# Patient Record
Sex: MALE | Race: WHITE | NOT HISPANIC OR LATINO
[De-identification: names, ages, dates, MRNs, and addresses within clinical notes are randomized per-mention and may not be internally consistent; named-entity substitution may affect disease eponyms.]

---

## 2017-01-17 ENCOUNTER — APPOINTMENT (OUTPATIENT)
Dept: GASTROENTEROLOGY | Facility: HOSPITAL | Age: 47
End: 2017-01-17

## 2017-01-17 ENCOUNTER — OUTPATIENT (OUTPATIENT)
Dept: OUTPATIENT SERVICES | Facility: HOSPITAL | Age: 47
LOS: 1 days | End: 2017-01-17
Payer: COMMERCIAL

## 2017-01-17 DIAGNOSIS — K21.9 GASTRO-ESOPHAGEAL REFLUX DISEASE WITHOUT ESOPHAGITIS: ICD-10-CM

## 2017-01-17 DIAGNOSIS — Z12.11 ENCOUNTER FOR SCREENING FOR MALIGNANT NEOPLASM OF COLON: ICD-10-CM

## 2017-01-17 PROCEDURE — 43239 EGD BIOPSY SINGLE/MULTIPLE: CPT | Mod: 59

## 2017-01-17 PROCEDURE — 45378 DIAGNOSTIC COLONOSCOPY: CPT

## 2017-01-17 PROCEDURE — G0105: CPT

## 2017-01-17 PROCEDURE — 43239 EGD BIOPSY SINGLE/MULTIPLE: CPT

## 2017-01-18 LAB — SURGICAL PATHOLOGY STUDY: SIGNIFICANT CHANGE UP

## 2018-01-12 ENCOUNTER — TRANSCRIPTION ENCOUNTER (OUTPATIENT)
Age: 48
End: 2018-01-12

## 2018-01-17 ENCOUNTER — APPOINTMENT (OUTPATIENT)
Dept: PULMONOLOGY | Facility: CLINIC | Age: 48
End: 2018-01-17

## 2018-05-08 ENCOUNTER — TRANSCRIPTION ENCOUNTER (OUTPATIENT)
Age: 48
End: 2018-05-08

## 2018-08-29 ENCOUNTER — TRANSCRIPTION ENCOUNTER (OUTPATIENT)
Age: 48
End: 2018-08-29

## 2019-02-11 ENCOUNTER — TRANSCRIPTION ENCOUNTER (OUTPATIENT)
Age: 49
End: 2019-02-11

## 2019-03-07 ENCOUNTER — TRANSCRIPTION ENCOUNTER (OUTPATIENT)
Age: 49
End: 2019-03-07

## 2019-03-12 ENCOUNTER — TRANSCRIPTION ENCOUNTER (OUTPATIENT)
Age: 49
End: 2019-03-12

## 2019-03-29 ENCOUNTER — OUTPATIENT (OUTPATIENT)
Dept: OUTPATIENT SERVICES | Facility: HOSPITAL | Age: 49
LOS: 1 days | End: 2019-03-29
Payer: COMMERCIAL

## 2019-03-29 VITALS
TEMPERATURE: 98 F | WEIGHT: 199.96 LBS | HEIGHT: 74 IN | DIASTOLIC BLOOD PRESSURE: 70 MMHG | OXYGEN SATURATION: 98 % | HEART RATE: 64 BPM | RESPIRATION RATE: 16 BRPM | SYSTOLIC BLOOD PRESSURE: 114 MMHG

## 2019-03-29 DIAGNOSIS — I49.9 CARDIAC ARRHYTHMIA, UNSPECIFIED: ICD-10-CM

## 2019-03-29 DIAGNOSIS — Z87.19 PERSONAL HISTORY OF OTHER DISEASES OF THE DIGESTIVE SYSTEM: Chronic | ICD-10-CM

## 2019-03-29 PROCEDURE — 93005 ELECTROCARDIOGRAM TRACING: CPT

## 2019-03-29 PROCEDURE — C1764: CPT

## 2019-03-29 PROCEDURE — ZZZZZ: CPT

## 2019-03-29 PROCEDURE — 93010 ELECTROCARDIOGRAM REPORT: CPT

## 2019-03-29 PROCEDURE — 33285 INSJ SUBQ CAR RHYTHM MNTR: CPT

## 2019-03-29 RX ORDER — SODIUM CHLORIDE 9 MG/ML
3 INJECTION INTRAMUSCULAR; INTRAVENOUS; SUBCUTANEOUS EVERY 8 HOURS
Qty: 0 | Refills: 0 | Status: DISCONTINUED | OUTPATIENT
Start: 2019-03-29 | End: 2019-04-13

## 2019-03-29 NOTE — H&P CARDIOLOGY - HISTORY OF PRESENT ILLNESS
48 yo  male pt with no significant prior medical history presents for Loop Recorder insertion. 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 MRI showed ?CVA lesion. Pt denies having weakness, numbness or any stroke symptoms but recalls having mild chest discomfort and dizziness on exertion, had Echo and stress test done in Dr. Baig's office then referred for Loop recorder.

## 2019-04-10 ENCOUNTER — APPOINTMENT (OUTPATIENT)
Dept: GASTROENTEROLOGY | Facility: CLINIC | Age: 49
End: 2019-04-10

## 2019-04-16 ENCOUNTER — APPOINTMENT (OUTPATIENT)
Dept: ELECTROPHYSIOLOGY | Facility: CLINIC | Age: 49
End: 2019-04-16
Payer: COMMERCIAL

## 2019-04-16 ENCOUNTER — NON-APPOINTMENT (OUTPATIENT)
Age: 49
End: 2019-04-16

## 2019-04-16 VITALS
WEIGHT: 202 LBS | HEIGHT: 74 IN | DIASTOLIC BLOOD PRESSURE: 80 MMHG | SYSTOLIC BLOOD PRESSURE: 121 MMHG | HEART RATE: 66 BPM | BODY MASS INDEX: 25.93 KG/M2 | OXYGEN SATURATION: 98 %

## 2019-04-16 PROBLEM — K44.9 DIAPHRAGMATIC HERNIA WITHOUT OBSTRUCTION OR GANGRENE: Chronic | Status: ACTIVE | Noted: 2019-03-29

## 2019-04-16 PROBLEM — I63.9 CEREBRAL INFARCTION, UNSPECIFIED: Chronic | Status: ACTIVE | Noted: 2019-03-29

## 2019-04-16 PROCEDURE — 99212 OFFICE O/P EST SF 10 MIN: CPT

## 2019-04-16 PROCEDURE — 93291 INTERROG DEV EVAL SCRMS IP: CPT

## 2019-11-01 ENCOUNTER — APPOINTMENT (OUTPATIENT)
Dept: ELECTROPHYSIOLOGY | Facility: CLINIC | Age: 49
End: 2019-11-01
Payer: COMMERCIAL

## 2019-11-01 VITALS — DIASTOLIC BLOOD PRESSURE: 78 MMHG | SYSTOLIC BLOOD PRESSURE: 119 MMHG | OXYGEN SATURATION: 96 % | HEART RATE: 61 BPM

## 2019-11-01 PROCEDURE — 93291 INTERROG DEV EVAL SCRMS IP: CPT

## 2019-11-01 RX ORDER — ROSUVASTATIN CALCIUM 5 MG/1
5 TABLET, FILM COATED ORAL
Refills: 0 | Status: ACTIVE | COMMUNITY

## 2020-01-16 ENCOUNTER — TRANSCRIPTION ENCOUNTER (OUTPATIENT)
Age: 50
End: 2020-01-16

## 2020-02-20 ENCOUNTER — TRANSCRIPTION ENCOUNTER (OUTPATIENT)
Age: 50
End: 2020-02-20

## 2020-03-12 ENCOUNTER — APPOINTMENT (OUTPATIENT)
Dept: PULMONOLOGY | Facility: CLINIC | Age: 50
End: 2020-03-12
Payer: COMMERCIAL

## 2020-03-12 VITALS
WEIGHT: 214 LBS | TEMPERATURE: 98.5 F | HEIGHT: 71 IN | BODY MASS INDEX: 29.96 KG/M2 | RESPIRATION RATE: 16 BRPM | SYSTOLIC BLOOD PRESSURE: 120 MMHG | HEART RATE: 86 BPM | DIASTOLIC BLOOD PRESSURE: 70 MMHG | OXYGEN SATURATION: 97 %

## 2020-03-12 DIAGNOSIS — Z82.49 FAMILY HISTORY OF ISCHEMIC HEART DISEASE AND OTHER DISEASES OF THE CIRCULATORY SYSTEM: ICD-10-CM

## 2020-03-12 DIAGNOSIS — Z83.79 FAMILY HISTORY OF OTHER DISEASES OF THE DIGESTIVE SYSTEM: ICD-10-CM

## 2020-03-12 DIAGNOSIS — Z83.2 FAMILY HISTORY OF DISEASES OF THE BLOOD AND BLOOD-FORMING ORGANS AND CERTAIN DISORDERS INVOLVING THE IMMUNE MECHANISM: ICD-10-CM

## 2020-03-12 PROCEDURE — 95012 NITRIC OXIDE EXP GAS DETER: CPT

## 2020-03-12 PROCEDURE — 94729 DIFFUSING CAPACITY: CPT

## 2020-03-12 PROCEDURE — 71046 X-RAY EXAM CHEST 2 VIEWS: CPT

## 2020-03-12 PROCEDURE — 94060 EVALUATION OF WHEEZING: CPT

## 2020-03-12 PROCEDURE — 99204 OFFICE O/P NEW MOD 45 MIN: CPT | Mod: 25

## 2020-03-12 PROCEDURE — 94618 PULMONARY STRESS TESTING: CPT

## 2020-03-12 PROCEDURE — 94726 PLETHYSMOGRAPHY LUNG VOLUMES: CPT

## 2020-03-12 PROCEDURE — ZZZZZ: CPT

## 2020-03-12 NOTE — HISTORY OF PRESENT ILLNESS
[TextBox_4] : Mr. MCKEON is a 50 year old male hx of herniated disk, CVA, loop recorder for ?Afib, colonic polyp, elevated cholesterol, prior GERD, s/p hiatal hernia surgery, childhood asthma,  presenting to the office today for initial pulmonary evaluation. His chief complaint is\par - He [facility]. He had nasal congestion and slight wheeze. He was given prednisone and Zithromax. \par - He has a viral infection in his eyes, he is being treated for dry eye. \par - As a kid he had very bad asthma and was on prednisone / cough medicine. \par - he notes his sinuses are dry / congested for the last 6 mo nths\par - he notes having terrible allergies, seasonal. \par - he notes not feeling SOB when going flight of stairs\par - He notes he snores\par - nick size: 16-17 \par - could not fall asleep while watching a boring TV show\par - could not fall asleep in a car\par - he notes he memory /concentration is good \par - He notes he does not sleep well. He wakes up frequently. He goes to bed at 12am and wakes up 5am. \par - He denies moving a lot when sleeping. \par - he notes he has put on a few pounds - 10lbs. \par - he has been going to the gym more frequently\par - His bowels are regular \par - he denies swollen glands\par - he notes having always hoarse voice due to GERD \par -he denies any headaches, nausea, vomiting, fever, chills, sweats, chest pain, chest pressure, diarrhea, constipation, dysphagia, dizziness, leg swelling, leg pain.

## 2020-03-12 NOTE — PROCEDURE
[FreeTextEntry1] : Full PFT mid to moderate obstructive  flows, with a FEV1 of 3.30L, which is 73% of predicted 55% change with BD , mid to low lung volumes , and a diffusion of 3.29 , which is 116% of predicted, with a normal flow volume loop\par \par \par 6 minute walk test reveals a low saturation of 95% with no evidence of dyspnea or fatigue; walked 0 668.3 meters\par \par \par FENO was 77; a normal value being less than 25\par Fractional exhaled nitric oxide (FENO) is regarded as a simple, noninvasive method for assessing eosinophilic airway inflammation. Produced by a variety of cells within the lung, nitric oxide (NO) concentrations are generally low in healthy individuals. However, high concentrations of NO appear to be involved in nonspecific host defense mechanisms and chronic inflammatory diseases such as asthma. The American Thoracic Society (ATS) therefore has recommended using FENO to aid in the diagnosis and monitoring of eosinophilic airway inflammation and asthma, and for identifying steroid responsive individuals whose chronic respiratory symptoms may be caused by airway inflammation.\par \par \par CXR reveals a normal sized heart- loop recorder in place; no evidence of infiltrate or effusion--a normal appearing chest radiograph\par

## 2020-03-12 NOTE — PHYSICAL EXAM
[No Acute Distress] : no acute distress [Normal Oropharynx] : normal oropharynx [Normal Appearance] : normal appearance [No Neck Mass] : no neck mass [Normal Rate/Rhythm] : normal rate/rhythm [Normal S1, S2] : normal s1, s2 [No Murmurs] : no murmurs [No Resp Distress] : no resp distress [Clear to Auscultation Bilaterally] : clear to auscultation bilaterally [No Abnormalities] : no abnormalities [Benign] : benign [Normal Gait] : normal gait [No Clubbing] : no clubbing [No Cyanosis] : no cyanosis [No Edema] : no edema [FROM] : FROM [Normal Color/ Pigmentation] : normal color/ pigmentation [No Focal Deficits] : no focal deficits [Oriented x3] : oriented x3 [Normal Affect] : normal affect [II] : Mallampati Class: II [TextBox_11] : little bit of wax in ears  [TextBox_68] : I:E ratio 1:3; clear

## 2020-03-12 NOTE — ASSESSMENT
[FreeTextEntry1] : Mr. MCKOEN is a 50 year old male with a history of  herniated disk, CVA, loop recorder for ?Afib, colonic polyp, elevated cholesterol, prior GERD, s/p hiatal hernia surgery, childhood asthma who comes into the office today for pulmonary evaluation for SOB \par \par The patient's shortness of breath is multifactorial due to:\par -pulmonary disease \par      - Asthma \par      - ?HEATHER\par -poor breathing mechanics \par -little bit overweight/out of shape\par -?cardiac disease - Dr. Baig ?Afib \par - GERD / LPR\par \par Problem 1: Asthma (mild)\par -Add Bevespi 2 inhalations BID\par -Add Alvesco (160) 2 inhalations BID\par -Add Ventolin rescue inhaler 2 inhalations before exercise, Q6H (PRN)\par -Asthma is believed to be caused by inherited (genetic) and environmental factor, but its exact cause is unknown. Asthma may be triggered by allergens, lung infections, or irritants in the air. Asthma triggers are different for each person\par \par \par Problem 2: Allergy / Sinus \par -Add Olopatadine 0.6% at 1 sniff/nostril BID\par -Complete blood work to include: IgE level, eosinophil level, vitamin D level, food IgE level, and asthma profile\par Environmental measures for allergies were encouraged including mattress and pillow cover, air purifier, and environmental controls.\par \par \par Problem 3: GERD / LPR -  s/p Nissen fundoplication \par -Add Pepcid 40 mg QHS\par -Rule of 2s: avoid eating too much, eating too fast, eating too late, eating too spicy, eating too lousy, eating two hours before bed.\par -Things to avoid including overeating, spicy foods, tight clothing, eating within three hours of bed, this list is not all inclusive. \par -For treatment of reflux, possible options discussed including diet control, H2 blockers, PPIs, as well as coating motility agents discussed as treatment options. Timing of meals and proximity of last meal to sleep were discussed. If symptoms persist, a formal gastrointestinal evaluation is needed.\par \par \par Problem 4: ?HEATHER(risk: neck size, chronic reflux, poor sleep)\par - Get Home Sleep Study to rule out sleep apnea\par - Suggested Oral Appliance for sleep apnea\par Sleep apnea is associated with adverse clinical consequences which an affect most organ systems. Cardiovascular disease risk includes arrhythmias, atrial fibrillation, hypertension, coronary artery disease, and stroke. Metabolic disorders include diabetes type 2, non-alcoholic fatty liver disease. Mood disorder especially depression; and cognitive decline especially in the elderly. Associations with chronic reflux/Byrne’s esophagus some but not all inclusive. \par -Reasons include arousal consistent with hypopnea; respiratory events most prominent in REM sleep or supine position; therefore sleep staging and body position are important for accurate diagnosis and estimation of AHI.\par \par Problem 5: \par \par Problem : Poor Mechanics of Breathing\par - Proper breathing techniques were reviewed with an emphasis of exhalation. Patient instructed to breath in for 1 second and out for four seconds. Patient was encouraged to not talk while walking. \par \par Problem : a little Overweight\par -Weight loss, exercise, and diet control were discussed and are highly encouraged. Treatment options were given such as, aqua therapy, and contacting a nutritionist. Recommended to use the elliptical, stationary bike, less use of treadmill. Mindful eating was explained to the patient Obesity is associated with worsening asthma, shortness of breath, and potential for cardiac disease, diabetes, and other underlying medical conditions. \par \par Problem : Cardiac\par -recommended to follow up with a cardiologist\par \par Problem : health maintenance\par - Recommended Throat Coat Tea ([place]) \par -s/p influenza vaccine\par -recommended strep pneumonia vaccines after age 65: Prevnar-13 vaccine, followed by Pneumo vaccine 23 one year following\par -recommended early intervention for URIs\par -recommended regular osteoporosis evaluations\par -recommended early dermatological evaluations\par -recommended after the age of 50 to the age of 70, colonoscopy every 5 years \par \par  Follow up in 6-8 weeks\par -he  is recommended to call with any changes, questions, or concerns.

## 2020-03-12 NOTE — ADDENDUM
[FreeTextEntry1] : Documented by Cait Jensen acting as a scribe for Dr. Demario Tai on 03/12/2020.\par \par All medical record entries made by the Scribe were at my, Dr. Demario Tai's, direction and personally dictated by me on 03/12/2020. I have reviewed the chart and agree that the record accurately reflects my personal performance of the history, physical exam, assessment and plan. I have also personally directed, reviewed, and agree with the discharge instructions.\par

## 2020-03-14 ENCOUNTER — LABORATORY RESULT (OUTPATIENT)
Age: 50
End: 2020-03-14

## 2020-03-15 LAB
24R-OH-CALCIDIOL SERPL-MCNC: 66.5 PG/ML
25(OH)D3 SERPL-MCNC: 27.8 NG/ML
BASOPHILS # BLD AUTO: 0.05 K/UL
BASOPHILS NFR BLD AUTO: 1 %
EOSINOPHIL # BLD AUTO: 0.48 K/UL
EOSINOPHIL NFR BLD AUTO: 10 %
HCT VFR BLD CALC: 49.4 %
HGB BLD-MCNC: 16.3 G/DL
IMM GRANULOCYTES NFR BLD AUTO: 0.2 %
LYMPHOCYTES # BLD AUTO: 1.71 K/UL
LYMPHOCYTES NFR BLD AUTO: 35.8 %
MAN DIFF?: NORMAL
MCHC RBC-ENTMCNC: 31 PG
MCHC RBC-ENTMCNC: 33 GM/DL
MCV RBC AUTO: 94.1 FL
MONOCYTES # BLD AUTO: 0.64 K/UL
MONOCYTES NFR BLD AUTO: 13.4 %
NEUTROPHILS # BLD AUTO: 1.89 K/UL
NEUTROPHILS NFR BLD AUTO: 39.6 %
PLATELET # BLD AUTO: 205 K/UL
RBC # BLD: 5.25 M/UL
RBC # FLD: 13.5 %
WBC # FLD AUTO: 4.78 K/UL

## 2020-03-16 LAB
A ALTERNATA IGE QN: 0.33 KUA/L
A FUMIGATUS IGE QN: 0.11 KUA/L
C ALBICANS IGE QN: <0.1 KUA/L
C HERBARUM IGE QN: <0.1 KUA/L
CAT DANDER IGE QN: 0.22 KUA/L
CLAM IGE QN: <0.1 KUA/L
CODFISH IGE QN: <0.1 KUA/L
COMMON RAGWEED IGE QN: <0.1 KUA/L
CORN IGE QN: <0.1 KUA/L
COW MILK IGE QN: <0.1 KUA/L
D FARINAE IGE QN: 0.14 KUA/L
D PTERONYSS IGE QN: 0.12 KUA/L
DEPRECATED A ALTERNATA IGE RAST QL: NORMAL
DEPRECATED A FUMIGATUS IGE RAST QL: NORMAL
DEPRECATED C ALBICANS IGE RAST QL: 0
DEPRECATED C HERBARUM IGE RAST QL: 0
DEPRECATED CAT DANDER IGE RAST QL: NORMAL
DEPRECATED CLAM IGE RAST QL: 0
DEPRECATED CODFISH IGE RAST QL: 0
DEPRECATED COMMON RAGWEED IGE RAST QL: 0
DEPRECATED CORN IGE RAST QL: 0
DEPRECATED COW MILK IGE RAST QL: 0
DEPRECATED D FARINAE IGE RAST QL: NORMAL
DEPRECATED D PTERONYSS IGE RAST QL: NORMAL
DEPRECATED DOG DANDER IGE RAST QL: 3
DEPRECATED EGG WHITE IGE RAST QL: 0
DEPRECATED M RACEMOSUS IGE RAST QL: 0
DEPRECATED PEANUT IGE RAST QL: 0
DEPRECATED ROACH IGE RAST QL: NORMAL
DEPRECATED SCALLOP IGE RAST QL: <0.1 KUA/L
DEPRECATED SESAME SEED IGE RAST QL: 0
DEPRECATED SHRIMP IGE RAST QL: 0
DEPRECATED SOYBEAN IGE RAST QL: 0
DEPRECATED TIMOTHY IGE RAST QL: 0
DEPRECATED WALNUT IGE RAST QL: 0
DEPRECATED WHEAT IGE RAST QL: 0
DEPRECATED WHITE OAK IGE RAST QL: NORMAL
DOG DANDER IGE QN: 4.65 KUA/L
EGG WHITE IGE QN: <0.1 KUA/L
M RACEMOSUS IGE QN: <0.1 KUA/L
PEANUT IGE QN: <0.1 KUA/L
ROACH IGE QN: 0.23 KUA/L
SCALLOP IGE QN: 0
SCALLOP IGE QN: <0.1 KUA/L
SESAME SEED IGE QN: <0.1 KUA/L
SOYBEAN IGE QN: <0.1 KUA/L
TIMOTHY IGE QN: <0.1 KUA/L
TOTAL IGE SMQN RAST: 30 KU/L
WALNUT IGE QN: <0.1 KUA/L
WHEAT IGE QN: <0.1 KUA/L
WHITE OAK IGE QN: 0.26 KUA/L

## 2020-03-17 DIAGNOSIS — J01.90 ACUTE SINUSITIS, UNSPECIFIED: ICD-10-CM

## 2020-04-24 ENCOUNTER — APPOINTMENT (OUTPATIENT)
Dept: PULMONOLOGY | Facility: CLINIC | Age: 50
End: 2020-04-24

## 2020-06-05 DIAGNOSIS — Z11.59 ENCOUNTER FOR SCREENING FOR OTHER VIRAL DISEASES: ICD-10-CM

## 2020-06-10 LAB
BASOPHILS # BLD AUTO: 0.06 K/UL
BASOPHILS NFR BLD AUTO: 1 %
EOSINOPHIL # BLD AUTO: 0.48 K/UL
EOSINOPHIL NFR BLD AUTO: 8.2 %
HCT VFR BLD CALC: 47.6 %
HGB BLD-MCNC: 16.1 G/DL
IMM GRANULOCYTES NFR BLD AUTO: 0.3 %
LYMPHOCYTES # BLD AUTO: 1.88 K/UL
LYMPHOCYTES NFR BLD AUTO: 32 %
MAN DIFF?: NORMAL
MCHC RBC-ENTMCNC: 30.8 PG
MCHC RBC-ENTMCNC: 33.8 GM/DL
MCV RBC AUTO: 91 FL
MONOCYTES # BLD AUTO: 0.53 K/UL
MONOCYTES NFR BLD AUTO: 9 %
NEUTROPHILS # BLD AUTO: 2.91 K/UL
NEUTROPHILS NFR BLD AUTO: 49.5 %
PLATELET # BLD AUTO: 261 K/UL
RBC # BLD: 5.23 M/UL
RBC # FLD: 12.2 %
SARS-COV-2 IGG SERPL IA-ACNC: 9.25 AU/ML
SARS-COV-2 IGG SERPL QL IA: NEGATIVE
WBC # FLD AUTO: 5.88 K/UL

## 2020-12-08 ENCOUNTER — APPOINTMENT (OUTPATIENT)
Dept: ORTHOPEDIC SURGERY | Facility: CLINIC | Age: 50
End: 2020-12-08
Payer: COMMERCIAL

## 2020-12-08 VITALS
WEIGHT: 208 LBS | HEART RATE: 90 BPM | BODY MASS INDEX: 26.69 KG/M2 | SYSTOLIC BLOOD PRESSURE: 111 MMHG | DIASTOLIC BLOOD PRESSURE: 70 MMHG | HEIGHT: 74 IN

## 2020-12-08 VITALS — TEMPERATURE: 97.6 F

## 2020-12-08 DIAGNOSIS — M79.672 PAIN IN LEFT FOOT: ICD-10-CM

## 2020-12-08 PROCEDURE — 73630 X-RAY EXAM OF FOOT: CPT | Mod: LT

## 2020-12-08 PROCEDURE — 99203 OFFICE O/P NEW LOW 30 MIN: CPT

## 2020-12-08 PROCEDURE — 99072 ADDL SUPL MATRL&STAF TM PHE: CPT

## 2020-12-23 PROBLEM — J01.90 ACUTE RHINOSINUSITIS: Status: RESOLVED | Noted: 2020-03-17 | Resolved: 2020-12-23

## 2021-01-02 ENCOUNTER — APPOINTMENT (OUTPATIENT)
Dept: ORTHOPEDIC SURGERY | Facility: CLINIC | Age: 51
End: 2021-01-02

## 2021-01-05 ENCOUNTER — APPOINTMENT (OUTPATIENT)
Dept: ORTHOPEDIC SURGERY | Facility: CLINIC | Age: 51
End: 2021-01-05
Payer: COMMERCIAL

## 2021-01-05 DIAGNOSIS — S93.602D UNSPECIFIED SPRAIN OF LEFT FOOT, SUBSEQUENT ENCOUNTER: ICD-10-CM

## 2021-01-05 DIAGNOSIS — M62.89 OTHER SPECIFIED DISORDERS OF MUSCLE: ICD-10-CM

## 2021-01-05 PROCEDURE — 99213 OFFICE O/P EST LOW 20 MIN: CPT

## 2021-01-05 PROCEDURE — 99072 ADDL SUPL MATRL&STAF TM PHE: CPT

## 2021-04-30 ENCOUNTER — OUTPATIENT (OUTPATIENT)
Dept: OUTPATIENT SERVICES | Facility: HOSPITAL | Age: 51
LOS: 1 days | End: 2021-04-30

## 2021-04-30 VITALS
HEIGHT: 73.5 IN | SYSTOLIC BLOOD PRESSURE: 110 MMHG | TEMPERATURE: 97 F | OXYGEN SATURATION: 97 % | RESPIRATION RATE: 16 BRPM | DIASTOLIC BLOOD PRESSURE: 78 MMHG | HEART RATE: 75 BPM | WEIGHT: 214.95 LBS

## 2021-04-30 DIAGNOSIS — K43.9 VENTRAL HERNIA WITHOUT OBSTRUCTION OR GANGRENE: ICD-10-CM

## 2021-04-30 DIAGNOSIS — J45.909 UNSPECIFIED ASTHMA, UNCOMPLICATED: ICD-10-CM

## 2021-04-30 DIAGNOSIS — I63.9 CEREBRAL INFARCTION, UNSPECIFIED: ICD-10-CM

## 2021-04-30 DIAGNOSIS — Z20.822 CONTACT WITH AND (SUSPECTED) EXPOSURE TO COVID-19: ICD-10-CM

## 2021-04-30 DIAGNOSIS — Z87.19 PERSONAL HISTORY OF OTHER DISEASES OF THE DIGESTIVE SYSTEM: Chronic | ICD-10-CM

## 2021-04-30 LAB
ALBUMIN SERPL ELPH-MCNC: 4.6 G/DL — SIGNIFICANT CHANGE UP (ref 3.3–5)
ALP SERPL-CCNC: 73 U/L — SIGNIFICANT CHANGE UP (ref 40–120)
ALT FLD-CCNC: 52 U/L — HIGH (ref 4–41)
ANION GAP SERPL CALC-SCNC: 15 MMOL/L — HIGH (ref 7–14)
AST SERPL-CCNC: 20 U/L — SIGNIFICANT CHANGE UP (ref 4–40)
BILIRUB SERPL-MCNC: 1 MG/DL — SIGNIFICANT CHANGE UP (ref 0.2–1.2)
BUN SERPL-MCNC: 20 MG/DL — SIGNIFICANT CHANGE UP (ref 7–23)
CALCIUM SERPL-MCNC: 9.2 MG/DL — SIGNIFICANT CHANGE UP (ref 8.4–10.5)
CHLORIDE SERPL-SCNC: 101 MMOL/L — SIGNIFICANT CHANGE UP (ref 98–107)
CO2 SERPL-SCNC: 24 MMOL/L — SIGNIFICANT CHANGE UP (ref 22–31)
CREAT SERPL-MCNC: 1.03 MG/DL — SIGNIFICANT CHANGE UP (ref 0.5–1.3)
GLUCOSE SERPL-MCNC: 74 MG/DL — SIGNIFICANT CHANGE UP (ref 70–99)
HCT VFR BLD CALC: 47.2 % — SIGNIFICANT CHANGE UP (ref 39–50)
HGB BLD-MCNC: 16 G/DL — SIGNIFICANT CHANGE UP (ref 13–17)
MCHC RBC-ENTMCNC: 30 PG — SIGNIFICANT CHANGE UP (ref 27–34)
MCHC RBC-ENTMCNC: 33.9 GM/DL — SIGNIFICANT CHANGE UP (ref 32–36)
MCV RBC AUTO: 88.6 FL — SIGNIFICANT CHANGE UP (ref 80–100)
NRBC # BLD: 0 /100 WBCS — SIGNIFICANT CHANGE UP
NRBC # FLD: 0 K/UL — SIGNIFICANT CHANGE UP
PLATELET # BLD AUTO: 299 K/UL — SIGNIFICANT CHANGE UP (ref 150–400)
POTASSIUM SERPL-MCNC: 3.6 MMOL/L — SIGNIFICANT CHANGE UP (ref 3.5–5.3)
POTASSIUM SERPL-SCNC: 3.6 MMOL/L — SIGNIFICANT CHANGE UP (ref 3.5–5.3)
PROT SERPL-MCNC: 7.1 G/DL — SIGNIFICANT CHANGE UP (ref 6–8.3)
RBC # BLD: 5.33 M/UL — SIGNIFICANT CHANGE UP (ref 4.2–5.8)
RBC # FLD: 12.3 % — SIGNIFICANT CHANGE UP (ref 10.3–14.5)
SODIUM SERPL-SCNC: 140 MMOL/L — SIGNIFICANT CHANGE UP (ref 135–145)
WBC # BLD: 7.3 K/UL — SIGNIFICANT CHANGE UP (ref 3.8–10.5)
WBC # FLD AUTO: 7.3 K/UL — SIGNIFICANT CHANGE UP (ref 3.8–10.5)

## 2021-04-30 RX ORDER — ASPIRIN/CALCIUM CARB/MAGNESIUM 324 MG
1 TABLET ORAL
Qty: 0 | Refills: 0 | DISCHARGE

## 2021-04-30 NOTE — H&P PST ADULT - OTHER CARE PROVIDERS
Dr. Baig Cardiologist, Dr. Benjamin Ferreira Neurologist Dr. Baig Cardiologist [phone], Dr. Chandler Neurologist [phone]

## 2021-04-30 NOTE — H&P PST ADULT - SURGICAL SITE INCISION
You can access the [facility] Patient Portal, offered by [facility], by registering with the following website: [email or web address]
no

## 2021-04-30 NOTE — H&P PST ADULT - NEGATIVE ENMT SYMPTOMS
no hearing difficulty/no ear pain/no tinnitus/no vertigo/no nasal discharge/no nose bleeds/no gum bleeding/no throat pain/no dysphagia

## 2021-04-30 NOTE — H&P PST ADULT - NSICDXFAMILYHX_GEN_ALL_CORE_FT
FAMILY HISTORY:  Father  Still living? Yes, Estimated age: Age Unknown  Family history of hypertension, Age at diagnosis: Age Unknown

## 2021-04-30 NOTE — H&P PST ADULT - NSICDXPROBLEM_GEN_ALL_CORE_FT
PROBLEM DIAGNOSES  Problem: Ventral hernia without obstruction or gangrene  Assessment and Plan: Patient scheduled for ventral hernia repair on 5/20/2021  Written & verbal preop instructions, gi prophylaxis & surgical soap given  Pt verbalized good understanding.  Teach back done on surgical soap instructions.   Patient had recent evaluation with PCP, pending copy of report    Problem: CVA (cerebral vascular accident)  Assessment and Plan: Patient with history of CVA, has loop recorder OR booking notified  Patient had recent evaluation with Cardiologist, pending copy of report    Problem: Asthma  Assessment and Plan: Patient instructed to take Ventolin inhaler as needed on AM of surgery    Problem: Encounter for screening laboratory testing for COVID-19 virus  Assessment and Plan: Patient aware of need for COVID testing prior to procedure and advised to co ordinate with surgeon.        PROBLEM DIAGNOSES  Problem: Ventral hernia without obstruction or gangrene  Assessment and Plan: Patient scheduled for ventral hernia repair on 5/20/2021  Written & verbal preop instructions, gi prophylaxis & surgical soap given  Pt verbalized good understanding.  Teach back done on surgical soap instructions.   Patient had recent evaluation with PCP, pending copy of report  HEATHER precaution, OR booking notified    Problem: CVA (cerebral vascular accident)  Assessment and Plan: Patient with history of CVA, has loop recorder OR booking notified  Patient had recent evaluation with Cardiologist, pending copy of report    Problem: Asthma  Assessment and Plan: Patient instructed to take Ventolin inhaler as needed on AM of surgery    Problem: Encounter for screening laboratory testing for COVID-19 virus  Assessment and Plan: Patient aware of need for COVID testing prior to procedure and advised to co ordinate with surgeon.

## 2021-04-30 NOTE — H&P PST ADULT - HISTORY OF PRESENT ILLNESS
52 yo male presents to [facility] for preop evaluation for ventral hernia repair.  Patient reports pulling sensation in mid abdomen over the past 3 months during exercise or running. Patient diagnosed with ventral hernia without obstruction or gangrene.  Patient denies abdominal pain, nausea, vomiting, diarrhea or constipation.

## 2021-04-30 NOTE — H&P PST ADULT - NSICDXPASTMEDICALHX_GEN_ALL_CORE_FT
PAST MEDICAL HISTORY:  Asthma     CVA (cerebral vascular accident) on MRI    Hiatal hernia     Hyperlipidemia      PAST MEDICAL HISTORY:  Asthma     CVA (cerebral vascular accident) on MRI    Fatty liver     Hiatal hernia     Hyperlipidemia     Ventral hernia without obstruction or gangrene

## 2021-04-30 NOTE — H&P PST ADULT - RESPIRATORY AND THORAX COMMENTS
Patient with history of asthma Pt with history of asthma well controlled on medications no recent exacerbations

## 2021-05-01 ENCOUNTER — INPATIENT (INPATIENT)
Facility: HOSPITAL | Age: 51
LOS: 1 days | Discharge: HOME | DRG: 64 | End: 2021-05-03
Attending: NEUROLOGICAL SURGERY | Admitting: NEUROLOGICAL SURGERY
Payer: COMMERCIAL

## 2021-05-01 VITALS
TEMPERATURE: 98 F | RESPIRATION RATE: 18 BRPM | HEART RATE: 76 BPM | SYSTOLIC BLOOD PRESSURE: 132 MMHG | OXYGEN SATURATION: 99 % | HEIGHT: 73.5 IN | DIASTOLIC BLOOD PRESSURE: 96 MMHG | WEIGHT: 214.95 LBS

## 2021-05-01 DIAGNOSIS — Z87.19 PERSONAL HISTORY OF OTHER DISEASES OF THE DIGESTIVE SYSTEM: Chronic | ICD-10-CM

## 2021-05-01 DIAGNOSIS — R53.1 WEAKNESS: ICD-10-CM

## 2021-05-01 LAB
ALBUMIN SERPL ELPH-MCNC: 4.2 G/DL — SIGNIFICANT CHANGE UP (ref 3.3–5)
ALP SERPL-CCNC: 75 U/L — SIGNIFICANT CHANGE UP (ref 40–120)
ALT FLD-CCNC: 50 U/L — HIGH (ref 10–45)
ANION GAP SERPL CALC-SCNC: 13 MMOL/L — SIGNIFICANT CHANGE UP (ref 5–17)
APTT BLD: 33 SEC — SIGNIFICANT CHANGE UP (ref 27.5–35.5)
AST SERPL-CCNC: 20 U/L — SIGNIFICANT CHANGE UP (ref 10–40)
BASE EXCESS BLDV CALC-SCNC: 3.7 MMOL/L — HIGH (ref -2–2)
BASOPHILS # BLD AUTO: 0.06 K/UL — SIGNIFICANT CHANGE UP (ref 0–0.2)
BASOPHILS NFR BLD AUTO: 0.8 % — SIGNIFICANT CHANGE UP (ref 0–2)
BILIRUB SERPL-MCNC: 0.9 MG/DL — SIGNIFICANT CHANGE UP (ref 0.2–1.2)
BUN SERPL-MCNC: 18 MG/DL — SIGNIFICANT CHANGE UP (ref 7–23)
CALCIUM SERPL-MCNC: 9.1 MG/DL — SIGNIFICANT CHANGE UP (ref 8.4–10.5)
CHLORIDE SERPL-SCNC: 106 MMOL/L — SIGNIFICANT CHANGE UP (ref 96–108)
CO2 BLDV-SCNC: 32 MMOL/L — HIGH (ref 22–30)
CO2 SERPL-SCNC: 25 MMOL/L — SIGNIFICANT CHANGE UP (ref 22–31)
CREAT SERPL-MCNC: 1.12 MG/DL — SIGNIFICANT CHANGE UP (ref 0.5–1.3)
EOSINOPHIL # BLD AUTO: 0.56 K/UL — HIGH (ref 0–0.5)
EOSINOPHIL NFR BLD AUTO: 7.8 % — HIGH (ref 0–6)
GLUCOSE SERPL-MCNC: 139 MG/DL — HIGH (ref 70–99)
HCO3 BLDV-SCNC: 31 MMOL/L — HIGH (ref 21–29)
HCT VFR BLD CALC: 47.2 % — SIGNIFICANT CHANGE UP (ref 39–50)
HGB BLD-MCNC: 16.2 G/DL — SIGNIFICANT CHANGE UP (ref 13–17)
IMM GRANULOCYTES NFR BLD AUTO: 0.3 % — SIGNIFICANT CHANGE UP (ref 0–1.5)
INR BLD: 0.96 RATIO — SIGNIFICANT CHANGE UP (ref 0.88–1.16)
LYMPHOCYTES # BLD AUTO: 2.35 K/UL — SIGNIFICANT CHANGE UP (ref 1–3.3)
LYMPHOCYTES # BLD AUTO: 32.6 % — SIGNIFICANT CHANGE UP (ref 13–44)
MCHC RBC-ENTMCNC: 30.8 PG — SIGNIFICANT CHANGE UP (ref 27–34)
MCHC RBC-ENTMCNC: 34.3 GM/DL — SIGNIFICANT CHANGE UP (ref 32–36)
MCV RBC AUTO: 89.7 FL — SIGNIFICANT CHANGE UP (ref 80–100)
MONOCYTES # BLD AUTO: 0.5 K/UL — SIGNIFICANT CHANGE UP (ref 0–0.9)
MONOCYTES NFR BLD AUTO: 6.9 % — SIGNIFICANT CHANGE UP (ref 2–14)
NEUTROPHILS # BLD AUTO: 3.72 K/UL — SIGNIFICANT CHANGE UP (ref 1.8–7.4)
NEUTROPHILS NFR BLD AUTO: 51.6 % — SIGNIFICANT CHANGE UP (ref 43–77)
NRBC # BLD: 0 /100 WBCS — SIGNIFICANT CHANGE UP (ref 0–0)
PCO2 BLDV: 56 MMHG — HIGH (ref 35–50)
PH BLDV: 7.36 — SIGNIFICANT CHANGE UP (ref 7.35–7.45)
PLATELET # BLD AUTO: 261 K/UL — SIGNIFICANT CHANGE UP (ref 150–400)
PO2 BLDV: 22 MMHG — LOW (ref 25–45)
POTASSIUM SERPL-MCNC: 4.1 MMOL/L — SIGNIFICANT CHANGE UP (ref 3.5–5.3)
POTASSIUM SERPL-SCNC: 4.1 MMOL/L — SIGNIFICANT CHANGE UP (ref 3.5–5.3)
PROT SERPL-MCNC: 6.9 G/DL — SIGNIFICANT CHANGE UP (ref 6–8.3)
PROTHROM AB SERPL-ACNC: 11.5 SEC — SIGNIFICANT CHANGE UP (ref 10.6–13.6)
RBC # BLD: 5.26 M/UL — SIGNIFICANT CHANGE UP (ref 4.2–5.8)
RBC # FLD: 12.2 % — SIGNIFICANT CHANGE UP (ref 10.3–14.5)
SAO2 % BLDV: 35 % — LOW (ref 67–88)
SARS-COV-2 RNA SPEC QL NAA+PROBE: SIGNIFICANT CHANGE UP
SODIUM SERPL-SCNC: 144 MMOL/L — SIGNIFICANT CHANGE UP (ref 135–145)
TROPONIN T, HIGH SENSITIVITY RESULT: 9 NG/L — SIGNIFICANT CHANGE UP (ref 0–51)
WBC # BLD: 7.21 K/UL — SIGNIFICANT CHANGE UP (ref 3.8–10.5)
WBC # FLD AUTO: 7.21 K/UL — SIGNIFICANT CHANGE UP (ref 3.8–10.5)

## 2021-05-01 PROCEDURE — 99291 CRITICAL CARE FIRST HOUR: CPT

## 2021-05-01 PROCEDURE — 70498 CT ANGIOGRAPHY NECK: CPT | Mod: 26,MA

## 2021-05-01 PROCEDURE — 70496 CT ANGIOGRAPHY HEAD: CPT | Mod: 26,MA

## 2021-05-01 RX ORDER — ACETAMINOPHEN 500 MG
650 TABLET ORAL EVERY 6 HOURS
Refills: 0 | Status: DISCONTINUED | OUTPATIENT
Start: 2021-05-01 | End: 2021-05-03

## 2021-05-01 RX ORDER — METOCLOPRAMIDE HCL 10 MG
10 TABLET ORAL ONCE
Refills: 0 | Status: COMPLETED | OUTPATIENT
Start: 2021-05-01 | End: 2021-05-01

## 2021-05-01 RX ORDER — ALBUTEROL 90 UG/1
2 AEROSOL, METERED ORAL EVERY 6 HOURS
Refills: 0 | Status: DISCONTINUED | OUTPATIENT
Start: 2021-05-01 | End: 2021-05-03

## 2021-05-01 RX ORDER — ATORVASTATIN CALCIUM 80 MG/1
20 TABLET, FILM COATED ORAL AT BEDTIME
Refills: 0 | Status: DISCONTINUED | OUTPATIENT
Start: 2021-05-01 | End: 2021-05-03

## 2021-05-01 RX ADMIN — Medication 10 MILLIGRAM(S): at 19:23

## 2021-05-01 RX ADMIN — ATORVASTATIN CALCIUM 20 MILLIGRAM(S): 80 TABLET, FILM COATED ORAL at 22:52

## 2021-05-01 NOTE — CONSULT NOTE ADULT - ASSESSMENT
50 y/o RH man with PMHx asthma, HLD on Crestor, ?TIAs, retrocerebellar arachnoid cyst being followed by Dr. Chandler with annual MRIs (imaging in Oct 2020 was stable) presents as code stroke for RUE paresthesias and RLE weakness described as "heaviness". LKN 3:00pm, preMRS 0, NIHSS 1 for RLE drift. tPA was not given as pt reported symptoms were rapidly improving and he had no disabling deficits on initial evaluation. CTH demonstrated retrocerebellar arachnoid cyst with mass effect, tonsillar herniation and effacement of 4th ventricle. CTA H/N negative for LVO, therefore pt was not a candidate for thrombectomy. Impression: Gait ataxia, RUE dysmetria, and RLE weakness secondary to interval expansion of patient's known retrocerebellar arachnoid cyst.     Recommendations:  -NSGY consultation for possible intervention  -MRI brain w/w/o contrast  -q1 or q2h neurochecks  -Pt to follow up with Dr. Chandler as outpatient upon discharge- able to see pt on Monday if needed    Pt to be seen and discussed with Dr. Calix, neurology attending.    Maryam Marsh DO  PGY-2  Neurology Resident   50 y/o RH man with PMHx asthma, HLD on Crestor, ?TIAs, retrocerebellar arachnoid cyst being followed by Dr. Chandler with annual MRIs (imaging in Oct 2020 was stable) presents as code stroke for RUE paresthesias and RLE weakness described as "heaviness". LKN 3:00pm, preMRS 0, NIHSS 1 for RLE drift. tPA was not given as pt reported symptoms were rapidly improving and he had no disabling deficits on initial evaluation. CTH demonstrated retrocerebellar arachnoid cyst with mass effect, tonsillar herniation and effacement of 4th ventricle. CTA H/N negative for LVO, therefore pt was not a candidate for thrombectomy. Impression: Gait ataxia, RUE dysmetria, and RLE weakness secondary to interval expansion of patient's known retrocerebellar arachnoid cyst.     Recommendations:  -NSGY consultation for possible intervention  -MRI brain w/w/o contrast  -q2h neurochecks  -Pt to follow up with Dr. Chandler as outpatient upon discharge- able to see pt on Monday if needed    Pt to be seen and discussed with Dr. Calix, neurology attending.    Maryam Marsh DO  PGY-2  Neurology Resident   52 y/o RH man with PMHx asthma, HLD on Crestor, ?TIAs, retrocerebellar arachnoid cyst being followed by Dr. Chandler with annual MRIs (imaging in Oct 2020 was stable) presents as code stroke for RUE paresthesias and RLE weakness described as "heaviness". LKN 3:00pm, preMRS 0, NIHSS 1 for RLE drift. tPA was not given as pt reported symptoms were rapidly improving and he had no disabling deficits on initial evaluation. CTH demonstrated retrocerebellar arachnoid cyst with mass effect, tonsillar herniation and effacement of 4th ventricle. CTA H/N negative for LVO, therefore pt was not a candidate for thrombectomy. Impression: Gait ataxia, RUE dysmetria, and RLE paresis secondary to interval expansion of patient's known retrocerebellar arachnoid cyst.     Recommendations:  -NSGY consultation for possible intervention  -MRI brain w/w/o contrast  -Monitor for signs of cerebral edema  -q4h neurochecks  -Pt to follow up with Dr. Chandler as outpatient upon discharge- able to see pt on Monday if needed    Pt to be seen and discussed with Dr. Calix, neurology attending.    Maryam Marsh,   PGY-2  Neurology Resident   50 y/o RH man with PMHx asthma, HLD on Crestor, ?TIAs, retrocerebellar arachnoid cyst being followed by Dr. Chandler with annual MRIs (imaging in Oct 2020 was stable) presents as code stroke for RUE paresthesias and RLE weakness described as "heaviness". LKN 3:00pm, preMRS 0, NIHSS 1 for RLE drift. tPA was not given as pt reported symptoms were rapidly improving and he had no disabling deficits on initial evaluation. CTH demonstrated retrocerebellar arachnoid cyst with mass effect, tonsillar herniation and effacement of 4th ventricle. CTA H/N negative for LVO, therefore pt was not a candidate for thrombectomy. Impression: Gait ataxia, RUE dysmetria, and RLE paresis secondary to interval expansion of patient's known retrocerebellar arachnoid cyst.     Recommendations:  -NSGY consultation for possible intervention  -MRI brain w/w/o contrast  -Monitor for signs of cerebral edema/herniation  -PT/OT evaluation  -q4h neurochecks  -Pt to follow up with Dr. Chandler as outpatient upon discharge- able to see pt on Monday if needed    Pt to be seen and discussed with Dr. Calix, neurology attending.    Maryam Marsh,   PGY-2  Neurology Resident   50 y/o RH man with PMHx asthma, HLD on Crestor, ?TIAs s/p recent Loop recorder placement, retrocerebellar arachnoid cyst being followed by Dr. Chandler with annual MRIs (imaging in Oct 2020 was stable) presents as code stroke for RUE paresthesias and RLE weakness described as "heaviness". LKN 3:00pm, preMRS 0, NIHSS 1 for RLE drift. tPA was not given as pt reported symptoms were rapidly improving and he had no disabling deficits on initial evaluation. CTH demonstrated retrocerebellar arachnoid cyst with mass effect, tonsillar herniation and partial effacement of 4th ventricle. CTA H/N negative for LVO, therefore pt was not a candidate for thrombectomy. Impression: Gait ataxia, RUE dysmetria, and RLE paresis possibly secondary to interval expansion of patient's known retrocerebellar arachnoid cyst, cannot rule out small acute ischemic infarct of R. cerebellum vs. ataxic hemiparesis secondary to ischemia of post. limb of internal capsule vs. basis pontis.     Recommendations:  -NSGY consultation for possible intervention  -MRI brain w/w/o contrast  -Interrogation of Loop recorder for occult arrhythmia  -Monitor for signs of cerebral edema/herniation  -PT/OT evaluation  -q4h neurochecks  -Pt to follow up with Dr. Cahndler as outpatient upon discharge- able to see pt on Monday if needed    Pt to be seen and discussed with Dr. Calix, neurology attending.    Maryam Marsh DO  PGY-2  Neurology Resident

## 2021-05-01 NOTE — H&P ADULT - ASSESSMENT
Diallo Covington  51M pmhx small CVA in past and known posterior fossa arachnoid cyst (follows with Dr. Ferreira per report 2020 imaging stable) presents with sudden onset R hemiparesis and ataxia. Intact on exam except subjective R sided heaviness and mild RUE/RLE sensory changes. Found to have arachnoid cyst with 1cm Tonsilar herniation, no headaches or UMN signs.  - No acute intervention  - MRI Brain wwo  - MRA Head / Neck wo  - Low suspicion for rapid symptom onset from chronic arachnoid cyst in the absence of other cardinal signs.

## 2021-05-01 NOTE — H&P ADULT - NSICDXPASTMEDICALHX_GEN_ALL_CORE_FT
PAST MEDICAL HISTORY:  Asthma     CVA (cerebral vascular accident) on MRI    Fatty liver     Hiatal hernia     Hyperlipidemia     Ventral hernia without obstruction or gangrene

## 2021-05-01 NOTE — ED PROVIDER NOTE - PHYSICAL EXAMINATION
Const: Well-nourished, Well-developed, appearing stated age.  Eyes: no conjunctival injection, and symmetrical lids.  HEENT: Head NCAT, no lesions. Atraumatic external nose and ears. Moist MM.  Neck: Symmetric, trachea midline.   CVS: +S1/S2, Peripheral pulses 2+ and equal in all extremities.  RESP: Unlabored respiratory effort. Clear to auscultation bilaterally.  GI: Nontender/Nondistended, No CVA tenderness b/l.   MSK: Normocephalic/Atraumatic, Lower Extremities w/o calf tenderness or edema b/l.   Skin: Warm, dry and intact.   Neuro: CNs II-XII grossly intact. Motor & Sensation grossly intact. +R leg drop, +ataxic gait favoring L side.   Psych: Awake, Alert, & Oriented (AAO) x3. Appropriate mood and affect.

## 2021-05-01 NOTE — ED ADULT NURSE REASSESSMENT NOTE - NS ED NURSE REASSESS COMMENT FT1
Pt resting in stretcher, a&ox3, nad, no increased wob noted, skin warm and appropriate color, speech coherent, able to move all extremities and follow all commands, PERRLA, no loss of sensation noted, no facial droop noted. RUE and RLE mildly weaker than LUE and LLE. Pt c/o h/a, medication administered as ordered. Pt denies CP, SOB, numbness/tingling, loss of sensation, dizziness, vision changes, lightheadedness, abdominal pain, n/v at this time. Pt NSR on cardiac monitor. neuro flow sheet in chart. Pending bed assignment.

## 2021-05-01 NOTE — H&P ADULT - NSHPPHYSICALEXAM_GEN_ALL_CORE
AOx3, FC, no dysmetria, EOMI  5/5 throughout, no drift  SILT  no clonus    Subjective R sided heaviness and mild RUE / RLE sensation changes

## 2021-05-01 NOTE — ED ADULT NURSE NOTE - PMH
Asthma    CVA (cerebral vascular accident)  on MRI  Fatty liver    Hiatal hernia    Hyperlipidemia    Ventral hernia without obstruction or gangrene

## 2021-05-01 NOTE — H&P ADULT - HISTORY OF PRESENT ILLNESS
Diallo Covington  51M pmhx small CVA in past and known posterior fossa arachnoid cyst (follows with Dr. Ferreira per report 2020 imaging stable) presents with sudden onset R hemiparesis and ataxia. Intact on exam except subjective R sided heaviness and mild RUE/RLE sensory changes. Found to have arachnoid cyst with 1cm Tonsilar herniation, no headaches or UMN signs.

## 2021-05-01 NOTE — ED PROVIDER NOTE - OBJECTIVE STATEMENT
51M pmhx of htn, small CVA in the past caught on MRIs outpatient, posterior fossa arachnoid cyst (follows with Dr. Ferreira) presenting as code stroke w sudden onset R sided weakness while walking around 3pm. Pt endorses resolution of the weakness but continued heaviness walking. No changes in speech or vision. +paresthesia on R side of body. Takes baby aspirin daily.

## 2021-05-01 NOTE — ED ADULT NURSE NOTE - OBJECTIVE STATEMENT
Code Stroke called [id number]  Pt taken to CT scan on EMS stretcher.   Pt is a 51 year old male with PMHx of TIA 2018 and hypercholesterolemia, BIBA after 8 hour car ride with R sided shoulder arm and leg tingling/numbness.  Pt on stretcher, alert and oriented x 3.  BL UE equal strength bilaterally, BL LE equal strength bilaterally.  No drift noted. Facial symmetry noted.  Pt denies chest pain or SOB.  Pt respirations even and unlabored.  Abdomen soft, non tender.  Skin warm, dry, and appropriate color for age and race.  Pt denies any other complaints at this time.

## 2021-05-01 NOTE — ED ADULT NURSE NOTE - NSIMPLEMENTINTERV_GEN_ALL_ED
Implemented All Universal Safety Interventions:  [place] to call system. Call bell, personal items and telephone within reach. Instruct patient to call for assistance. Room bathroom lighting operational. Non-slip footwear when patient is off stretcher. Physically safe environment: no spills, clutter or unnecessary equipment. Stretcher in lowest position, wheels locked, appropriate side rails in place.

## 2021-05-01 NOTE — CONSULT NOTE ADULT - ASSESSMENT
Diallo Covington  51M pmhx small CVA in past and known posterior fossa arachnoid cyst (follows with Dr. Ferreira per report 2020 imaging stable) presents with sudden onset R hemiparesis and ataxia. Intact on exam except subjective R sided heaviness. Found to have arachnoid cyst with 1cm Tonsilar herniation, no headaches or UMN signs.  - No acute intervention  - MRI Brain wwo  - MRA Head / Neck wo  - Low suspicion for rapid symptom onset from chronic arachnoid cyst in the absence of other cardinal signs. Diallo Covington  51M pmhx small CVA in past and known posterior fossa arachnoid cyst (follows with Dr. Ferreira per report 2020 imaging stable) presents with sudden onset R hemiparesis and ataxia. Intact on exam except subjective R sided heaviness and mild RUE/RLE sensory changes. Found to have arachnoid cyst with 1cm Tonsilar herniation, no headaches or UMN signs.  - No acute intervention  - MRI Brain wwo  - MRA Head / Neck wo  - Low suspicion for rapid symptom onset from chronic arachnoid cyst in the absence of other cardinal signs.

## 2021-05-01 NOTE — ED PROVIDER NOTE - CLINICAL SUMMARY MEDICAL DECISION MAKING FREE TEXT BOX
HPI:  9/19/19, Time: 10:17 PM         Jhoan Everett is a 49 y.o. male presenting to the ED for hx of 10 -12 benadryl ingestion, beginning hours ago. The complaint has been persistent, moderate in severity, and worsened by nothing. Patient per report was seen at outlying facility and sent here because of drug ingestion and concern for his safety. Patient reporting not wanting to hurt himself or harm himself. Patient reporting no chest pain or difficulty breathing. Patient does admit to drinking alcohol today. Patient reporting no chest pain or vomiting or diarrhea. There is no history of trauma or injury    ROS:   Pertinent positives and negatives are stated within HPI, all other systems reviewed and are negative.  --------------------------------------------- PAST HISTORY ---------------------------------------------  Past Medical History:  has a past medical history of Hernia. Past Surgical History:  has a past surgical history that includes Mandible fracture surgery. Social History:  reports that he has been smoking. He has a 27.00 pack-year smoking history. He uses smokeless tobacco. He reports that he drinks alcohol. He reports that he has current or past drug history. Drug: Marijuana. Family History: family history is not on file. The patients home medications have been reviewed. Allergies: Patient has no known allergies. ---------------------------------------------------PHYSICAL EXAM--------------------------------------    Constitutional/General: Alert and oriented x3, well appearing, non toxic in NAD  Head: Normocephalic and atraumatic  Eyes: PERRL, EOMI  Mouth: Oropharynx clear, handling secretions, no trismus  Neck: Supple, full ROM, non tender to palpation in the midline, no stridor, no crepitus, no meningeal signs  Pulmonary: Lungs clear to auscultation bilaterally, no wheezes, rales, or rhonchi. Not in respiratory distress  Cardiovascular:  Regular rate. Regular rhythm.  No DETECTED Negative < 200 ng/mL    Benzodiazepine Screen, Urine NOT DETECTED Negative < 200 ng/mL    Cannabinoid Scrn, Ur NOT DETECTED Negative < 50ng/mL    Cocaine Metabolite Screen, Urine NOT DETECTED Negative < 300 ng/mL    Opiate Scrn, Ur NOT DETECTED Negative < 300ng/mL    PCP Screen, Urine NOT DETECTED Negative < 25 ng/mL    Methadone Screen, Urine NOT DETECTED Negative <300 ng/mL    Propoxyphene Scrn, Ur NOT DETECTED Negative <300 ng/mL    Drug Screen Comment: see below    SPECIMEN REJECTION   Result Value Ref Range    Rejected Test CMP SDS2 TROP     Reason for Rejection see below    Comprehensive Metabolic Panel   Result Value Ref Range    Sodium 144 132 - 146 mmol/L    Potassium 3.5 3.5 - 5.0 mmol/L    Chloride 106 98 - 107 mmol/L    CO2 24 22 - 29 mmol/L    Anion Gap 14 7 - 16 mmol/L    Glucose 91 74 - 99 mg/dL    BUN 10 6 - 20 mg/dL    CREATININE 0.7 0.7 - 1.2 mg/dL    GFR Non-African American >60 >=60 mL/min/1.73    GFR African American >60     Calcium 8.5 (L) 8.6 - 10.2 mg/dL    Total Protein 6.9 6.4 - 8.3 g/dL    Alb 4.3 3.5 - 5.2 g/dL    Total Bilirubin 0.2 0.0 - 1.2 mg/dL    Alkaline Phosphatase 71 40 - 129 U/L    ALT 23 0 - 40 U/L    AST 23 0 - 39 U/L   Serum Drug Screen   Result Value Ref Range    Ethanol Lvl 222 mg/dL    Acetaminophen Level <5.0 (L) 10.0 - 43.2 mcg/mL    Salicylate, Serum <9.5 0.0 - 30.0 mg/dL   Troponin   Result Value Ref Range    Troponin <0.01 0.00 - 0.03 ng/mL   Ethanol   Result Value Ref Range    Ethanol Lvl 136 mg/dL       RADIOLOGY:  Interpreted by Radiologist.  No orders to display       EKG: This EKG is signed and interpreted by me. Rate: 87  Rhythm: Sinus  Interpretation: no acute changes  Comparison: no previous EKG available          ------------------------- NURSING NOTES AND VITALS REVIEWED ---------------------------   The nursing notes within the ED encounter and vital signs as below have been reviewed by myself.   BP (!) 150/100   Pulse 100   Temp 97.8 °F (36.6 51M presenting as a code stroke - neuro at bedside. PE: VSS, +R leg drop w ataxia on ambulation. Ddx: Possible compression from existing cyst vs stroke. Plan: labs, CT, neuro/neurosurg. Yoni Varela, ENRIQUE PGY2 51M presenting as a code stroke - neuro at bedside. PE: VSS, +R leg drop w ataxia on ambulation. Ddx: Possible compression from existing cyst vs stroke. Plan: labs, CT, neuro/neurosurg. Yoni Varela, EM PGY2    FOSTER Flores MD: Pt is a 50 y/o male with PMH HTN, previous CVA found on brain imaging, posterior fossa arachnoid cyst who p/w c/o acute onset R sided weakness at 3PM. CODE STROKE called in triage. CT imaging demonstrated cyst causing herniation, no obvious new CVA, however, MRI imaging recommended per Neuro. [place] [facility] [place], further eval and mgt

## 2021-05-01 NOTE — CONSULT NOTE ADULT - SUBJECTIVE AND OBJECTIVE BOX
p (1480)     HPI:  AOx3, FC, no dysmetria, EOMI  5/5 throughout, no drift  SILT  no clonus    Subjective R sided heaviness    Imaging:    Exam:    --Anticoagulation:    =====================  PAST MEDICAL HISTORY   CVA (cerebral vascular accident)    Hiatal hernia    Hyperlipidemia    Asthma    Ventral hernia without obstruction or gangrene    Fatty liver      PAST SURGICAL HISTORY   H/O hiatal hernia      Cats (Unknown)  Dogs (Unknown)  Pollen (Sneezing)  Ragweed (Unknown)      MEDICATIONS:  Antibiotics:    Neuro:    Other:      SOCIAL HISTORY:   Occupation:   Marital Status:     FAMILY HISTORY:  Family history of hypertension (Father)        ROS: Negative except per HPI    LABS:  PT/INR - ( 01 May 2021 16:21 )   PT: 11.5 sec;   INR: 0.96 ratio         PTT - ( 01 May 2021 16:21 )  PTT:33.0 sec                        16.2   7.21  )-----------( 261      ( 01 May 2021 16:21 )             47.2     05-01    144  |  106  |  18  ----------------------------<  139<H>  4.1   |  25  |  1.12    Ca    9.1      01 May 2021 16:21    TPro  6.9  /  Alb  4.2  /  TBili  0.9  /  DBili  x   /  AST  20  /  ALT  50<H>  /  AlkPhos  75  05-01       p (1480)     HPI:  51M pmhx small CVA in past and known posterior fossa arachnoid cyst (follows with Dr. Ferreira per report 2020 imaging stable) presents with sudden onset R hemiparesis and ataxia. Intact on exam except subjective R sided heaviness. Found to have arachnoid cyst with 1cm Tonsilar herniation, no headaches or UMN signs.      Imaging:    Exam:  AOx3, FC, no dysmetria, EOMI  5/5 throughout, no drift  SILT  no clonus    Subjective R sided heaviness  --Anticoagulation:    =====================  PAST MEDICAL HISTORY   CVA (cerebral vascular accident)    Hiatal hernia    Hyperlipidemia    Asthma    Ventral hernia without obstruction or gangrene    Fatty liver      PAST SURGICAL HISTORY   H/O hiatal hernia      Cats (Unknown)  Dogs (Unknown)  Pollen (Sneezing)  Ragweed (Unknown)      MEDICATIONS:  Antibiotics:    Neuro:    Other:      SOCIAL HISTORY:   Occupation:   Marital Status:     FAMILY HISTORY:  Family history of hypertension (Father)        ROS: Negative except per HPI    LABS:  PT/INR - ( 01 May 2021 16:21 )   PT: 11.5 sec;   INR: 0.96 ratio         PTT - ( 01 May 2021 16:21 )  PTT:33.0 sec                        16.2   7.21  )-----------( 261      ( 01 May 2021 16:21 )             47.2     05-01    144  |  106  |  18  ----------------------------<  139<H>  4.1   |  25  |  1.12    Ca    9.1      01 May 2021 16:21    TPro  6.9  /  Alb  4.2  /  TBili  0.9  /  DBili  x   /  AST  20  /  ALT  50<H>  /  AlkPhos  75  05-01       p (1480)     HPI:  51M pmhx small CVA in past and known posterior fossa arachnoid cyst (follows with Dr. Ferreira per report 2020 imaging stable) presents with sudden onset R hemiparesis and ataxia. Intact on exam except subjective R sided heaviness. Found to have arachnoid cyst with 1cm Tonsilar herniation, no headaches or UMN signs.      Imaging:    Exam:  AOx3, FC, no dysmetria, EOMI  5/5 throughout, no drift  SILT  no clonus    Subjective R sided heaviness and mild RUE / RLE sensation changes  --Anticoagulation:    =====================  PAST MEDICAL HISTORY   CVA (cerebral vascular accident)    Hiatal hernia    Hyperlipidemia    Asthma    Ventral hernia without obstruction or gangrene    Fatty liver      PAST SURGICAL HISTORY   H/O hiatal hernia      Cats (Unknown)  Dogs (Unknown)  Pollen (Sneezing)  Ragweed (Unknown)      MEDICATIONS:  Antibiotics:    Neuro:    Other:      SOCIAL HISTORY:   Occupation:   Marital Status:     FAMILY HISTORY:  Family history of hypertension (Father)        ROS: Negative except per HPI    LABS:  PT/INR - ( 01 May 2021 16:21 )   PT: 11.5 sec;   INR: 0.96 ratio         PTT - ( 01 May 2021 16:21 )  PTT:33.0 sec                        16.2   7.21  )-----------( 261      ( 01 May 2021 16:21 )             47.2     05-01    144  |  106  |  18  ----------------------------<  139<H>  4.1   |  25  |  1.12    Ca    9.1      01 May 2021 16:21    TPro  6.9  /  Alb  4.2  /  TBili  0.9  /  DBili  x   /  AST  20  /  ALT  50<H>  /  AlkPhos  75  05-01

## 2021-05-01 NOTE — CONSULT NOTE ADULT - SUBJECTIVE AND OBJECTIVE BOX
jose for pt to  rx and informed of message per dr byrd    HPI:  52 y/o RH man with PMHx asthma, HLD on Crestor, ?TIAs, retrocerebellar arachnoid cyst being followed by Dr. Chandler with annual MRIs (imaging in October 2020 was stable) presents as code stroke for RUE paresthesias and RLE weakness described as "heaviness". presents as code stroke for right arm paresthesias and right leg heaviness. Pt reports he drove a total of 8 hours today to drop off his daughter to college. He returned home and began to play with his dog and had difficulty kicking a ball. Pt said he suddenly felt heaviness of his right leg, but did not fall. He denied head trauma or LOC. He had no speech changes, vision changes, nausea, vomiting, dizziness, or headache. In the ED when pt was asked to ambulate, he stated he felt slightly off balance.    LKN 3:00pm, preMRS 0, NIHSS 1 for RLE drift. tPA was not given as pt reported symptoms were rapidly improving and he had no disabling deficits on initial evaluation. CTH demonstrated retrocerebellar arachnoid cyst with mass effect, tonsillar herniation and effacement of 4th ventricle. CTA H/N negative for LVO, therefore pt was not a candidate for thrombectomy.    PAST MEDICAL & SURGICAL HISTORY:  CVA (cerebral vascular accident)  Hiatal hernia  Hyperlipidemia  Asthma  Ventral hernia without obstruction or gangrene  Fatty liver  H/O hiatal hernia  2006 fundoplication    FAMILY HISTORY:  Family history of hypertension (Father)    Allergies  Cats (Unknown)  Dogs (Unknown)  No Known Drug Allergies  Pollen (Sneezing)  Ragweed (Unknown)      SHx - No smoking, No ETOH, No drug abuse    Review of Systems:  CONSTITUTIONAL: No fevers, chills, night sweats or weight loss  HEENT:  No visual loss, blurred vision, double vision.  No hearing loss, sneezing, congestion, runny nose or sore throat.  SKIN:  No rash or itching.  CARDIOVASCULAR:  No chest pain, chest pressure or chest discomfort. No palpitations or edema.  RESPIRATORY:  No shortness of breath, cough or sputum.  GASTROINTESTINAL:  No anorexia, nausea, vomiting or diarrhea. No abdominal pain.  GENITOURINARY:  No dysuria. No increased frequency. No retention. No incontinence.  NEUROLOGICAL:  See HPI  MUSCULOSKELETAL:  No muscle, back pain, joint pain or stiffness.  HEMATOLOGIC:  No anemia, bleeding or bruising.    Vital Signs Last 24 Hrs  T(C): 36.6 (01 May 2021 16:10), Max: 36.6 (01 May 2021 16:10)  T(F): 97.9 (01 May 2021 16:10), Max: 97.9 (01 May 2021 16:10)  HR: 82 (01 May 2021 16:45) (76 - 82)  BP: 140/80 (01 May 2021 16:45) (132/96 - 140/80)  BP(mean): --  RR: 16 (01 May 2021 16:45) (16 - 18)  SpO2: 99% (01 May 2021 16:45) (99% - 99%)    PHYSICAL EXAM:  GENERAL: NAD  HEENT: Normocephalic;  conjunctivae and sclerae clear; moist mucous membranes;   NECK: Supple  EXTREMITIES: no cyanosis; no edema; no calf tenderness  SKIN: warm and dry; no rash             Neurological Exam:  - Mental Status: Alert, oriented to person, place, time, situation; speech is fluent with intact naming, repetition, and comprehension  - Cranial Nerves II-XII:    II:  PERRL 3mm b/l; visual fields are full to confrontation  III, IV, VI:  EOMI, no nystagmus  V:  facial sensation is intact in the V1-V3 distribution bilaterally.  VII:  face is symmetric with normal eye closure and smile  VIII:  hearing is intact to voice  IX, X:  uvula is midline and soft palate rises symmetrically  XI:  head turning and shoulder shrug are intact bilaterally  XII:  tongue protrudes in the midline  - Motor: very subtle drift of RLE, otherwise 5/5 for all muscle groups both proximally and distally; normal muscle bulk and tone throughout; no pronator drift  - Reflexes:  2+ and symmetric at the biceps, triceps, brachioradialis, knees, and ankles;  plantar reflexes downgoing bilaterally  - Sensory:  intact to light touch and symmetric throughout, no extinction  - Coordination: R. FTN with dysmetria, no dysmetria on HKS, rapid alternating hand movements intact  - Gait: wide based gait    Other:    05-01    144  |  106  |  18  ----------------------------<  139<H>  4.1   |  25  |  1.12    Ca    9.1      01 May 2021 16:21    TPro  6.9  /  Alb  4.2  /  TBili  0.9  /  DBili  x   /  AST  20  /  ALT  50<H>  /  AlkPhos  75  05-01                            16.2   7.21  )-----------( 261      ( 01 May 2021 16:21 )             47.2       Radiology  CT Brain Stroke Protocol (05.01.21 @ 16:28)   IMPRESSION:    HEAD CT: No acute intracranial hemorrhage, brain edema, or midline shift. Left retrocerebellar arachnoid cyst with associated cerebellar tonsillar herniation and slight effacement of the fourth ventricle without antonio supratentorial hydrocephalus.    If symptoms persist consider follow up head CT or MRI, MRA if no contraindication.    CTA COW:  Patent intracranial circulation without flow limiting stenosis    CTA NECK: Patent, ECAs, ICAs, no hemodynamically significant stenosis at ICA origins by NASCET criteria.  Bilateral vertebral arteries are patent without flow limiting stenosis.                HPI:  52 y/o RH man with PMHx asthma, HLD on Crestor, ?TIAs, multiple lower back disc herniations w/ radiculopathy, retrocerebellar arachnoid cyst being followed by Dr. Chandler with annual MRIs (imaging in October 2020 was stable) presents as code stroke for RUE paresthesias and RLE weakness described as "heaviness". presents as code stroke for right arm paresthesias and right leg heaviness. Pt reports he drove a total of 8 hours today to drop off his daughter to college. Pt said his initial symptoms were numbness/tingling of his right arm, including the hand. When he returned home, he took the dog outside and noticed he had difficulty kicking a ball. Pt said he suddenly felt heaviness of his right leg at approximately 3pm. He denies fall, head trauma, or LOC. He had no speech changes, vision changes, nausea, vomiting, dizziness, or headache. In the ED when pt was asked to ambulate, he stated he felt slightly off balance.    LKN 3:00pm, preMRS 0, NIHSS 1 for RLE drift. tPA was not given as pt reported symptoms were rapidly improving and he had no disabling deficits on initial evaluation. CTH demonstrated retrocerebellar arachnoid cyst with mass effect, tonsillar herniation and effacement of 4th ventricle. CTA H/N negative for LVO, therefore pt was not a candidate for thrombectomy.    PAST MEDICAL & SURGICAL HISTORY:  CVA (cerebral vascular accident)  Hiatal hernia  Hyperlipidemia  Asthma  Ventral hernia without obstruction or gangrene  Fatty liver  H/O hiatal hernia  2006 fundoplication    FAMILY HISTORY:  Family history of hypertension (Father)    Allergies  Cats (Unknown)  Dogs (Unknown)  No Known Drug Allergies  Pollen (Sneezing)  Ragweed (Unknown)      SHx - No smoking, No ETOH, No drug abuse    Review of Systems:  CONSTITUTIONAL: No fevers, chills, night sweats or weight loss  HEENT:  No visual loss, blurred vision, double vision.  No hearing loss, sneezing, congestion, runny nose or sore throat.  SKIN:  No rash or itching.  CARDIOVASCULAR:  No chest pain, chest pressure or chest discomfort. No palpitations or edema.  RESPIRATORY:  No shortness of breath, cough or sputum.  GASTROINTESTINAL:  No anorexia, nausea, vomiting or diarrhea. No abdominal pain.  GENITOURINARY:  No dysuria. No increased frequency. No retention. No incontinence.  NEUROLOGICAL:  See HPI  MUSCULOSKELETAL:  No muscle, back pain, joint pain or stiffness.  HEMATOLOGIC:  No anemia, bleeding or bruising.    Vital Signs Last 24 Hrs  T(C): 36.6 (01 May 2021 16:10), Max: 36.6 (01 May 2021 16:10)  T(F): 97.9 (01 May 2021 16:10), Max: 97.9 (01 May 2021 16:10)  HR: 82 (01 May 2021 16:45) (76 - 82)  BP: 140/80 (01 May 2021 16:45) (132/96 - 140/80)  BP(mean): --  RR: 16 (01 May 2021 16:45) (16 - 18)  SpO2: 99% (01 May 2021 16:45) (99% - 99%)    PHYSICAL EXAM:  GENERAL: NAD  HEENT: Normocephalic;  conjunctivae and sclerae clear; moist mucous membranes;   NECK: Supple  EXTREMITIES: no cyanosis; no edema; no calf tenderness  SKIN: warm and dry; no rash             Neurological Exam:  - Mental Status: Alert, oriented to person, place, time, situation; speech is fluent with intact naming, repetition, and comprehension  - Cranial Nerves II-XII:    II:  PERRL 3mm b/l; visual fields are full to confrontation  III, IV, VI:  EOMI, no nystagmus  V:  facial sensation is intact in the V1-V3 distribution bilaterally.  VII:  face is symmetric with normal eye closure and smile  VIII:  hearing is intact to voice  IX, X:  uvula is midline and soft palate rises symmetrically  XI:  head turning and shoulder shrug are intact bilaterally  XII:  tongue protrudes in the midline  - Motor: very subtle drift of RLE, otherwise 5/5 for all muscle groups both proximally and distally; normal muscle bulk and tone throughout; no pronator drift  - Reflexes:  2+ and symmetric at the biceps, triceps, brachioradialis, knees, and ankles;  plantar reflexes downgoing bilaterally  - Sensory:  intact to light touch and symmetric throughout, no extinction  - Coordination: R. FTN with dysmetria, no dysmetria on HKS, rapid alternating hand movements intact  - Gait: wide based gait    Other:    05-01    144  |  106  |  18  ----------------------------<  139<H>  4.1   |  25  |  1.12    Ca    9.1      01 May 2021 16:21    TPro  6.9  /  Alb  4.2  /  TBili  0.9  /  DBili  x   /  AST  20  /  ALT  50<H>  /  AlkPhos  75  05-01                            16.2   7.21  )-----------( 261      ( 01 May 2021 16:21 )             47.2       Radiology  CT Brain Stroke Protocol (05.01.21 @ 16:28)   IMPRESSION:    HEAD CT: No acute intracranial hemorrhage, brain edema, or midline shift. Left retrocerebellar arachnoid cyst with associated cerebellar tonsillar herniation and slight effacement of the fourth ventricle without antonio supratentorial hydrocephalus.    If symptoms persist consider follow up head CT or MRI, MRA if no contraindication.    CTA COW:  Patent intracranial circulation without flow limiting stenosis    CTA NECK: Patent, ECAs, ICAs, no hemodynamically significant stenosis at ICA origins by NASCET criteria.  Bilateral vertebral arteries are patent without flow limiting stenosis.                HPI:  50 y/o RH man with PMHx asthma, HLD on Crestor, ?TIAs s/p Loop recorder placement, multiple lower back disc herniations w/ radiculopathy, retrocerebellar arachnoid cyst being followed by Dr. Chandler with annual MRIs (imaging in October 2020 was stable) presents as code stroke for RUE paresthesias and RLE weakness described as "heaviness". presents as code stroke for right arm paresthesias and right leg heaviness. Pt reports he drove a total of 8 hours today to drop off his daughter to college. Pt said his initial symptoms were numbness/tingling of his right arm, including the hand. When he returned home, he took the dog outside and noticed he had difficulty kicking a ball. Pt said he suddenly felt heaviness of his right leg at approximately 3pm. He denies fall, head trauma, or LOC. He had no speech changes, vision changes, nausea, vomiting, dizziness, or headache. In the ED when pt was asked to ambulate, he stated he felt slightly off balance but was able to do so without assistance.    LKN 3:00pm, preMRS 0, NIHSS 1 for RLE drift. tPA was not given as pt reported symptoms were rapidly improving and he had no disabling deficits on initial evaluation. CTH demonstrated retrocerebellar arachnoid cyst with mass effect, tonsillar herniation and effacement of 4th ventricle. CTA H/N negative for LVO, therefore pt was not a candidate for thrombectomy.    PAST MEDICAL & SURGICAL HISTORY:  CVA (cerebral vascular accident)  Hiatal hernia  Hyperlipidemia  Asthma  Ventral hernia without obstruction or gangrene  Fatty liver  H/O hiatal hernia  2006 fundoplication    FAMILY HISTORY:  Family history of hypertension (Father)    Allergies  Cats (Unknown)  Dogs (Unknown)  No Known Drug Allergies  Pollen (Sneezing)  Ragweed (Unknown)    SHx - No smoking, No ETOH, No drug abuse    Review of Systems:  CONSTITUTIONAL: No fevers, chills, night sweats or weight loss  HEENT:  No visual loss, blurred vision, double vision.  No hearing loss, sneezing, congestion, runny nose or sore throat.  SKIN:  No rash or itching.  CARDIOVASCULAR:  No chest pain, chest pressure or chest discomfort. No palpitations or edema.  RESPIRATORY:  No shortness of breath, cough or sputum.  GASTROINTESTINAL:  No anorexia, nausea, vomiting or diarrhea. No abdominal pain.  GENITOURINARY:  No dysuria. No increased frequency. No retention. No incontinence.  NEUROLOGICAL:  See HPI  MUSCULOSKELETAL:  No muscle, back pain, joint pain or stiffness.  HEMATOLOGIC:  No anemia, bleeding or bruising.    Vital Signs Last 24 Hrs  T(C): 36.6 (01 May 2021 16:10), Max: 36.6 (01 May 2021 16:10)  T(F): 97.9 (01 May 2021 16:10), Max: 97.9 (01 May 2021 16:10)  HR: 82 (01 May 2021 16:45) (76 - 82)  BP: 140/80 (01 May 2021 16:45) (132/96 - 140/80)  BP(mean): --  RR: 16 (01 May 2021 16:45) (16 - 18)  SpO2: 99% (01 May 2021 16:45) (99% - 99%)    PHYSICAL EXAM:  GENERAL: NAD  HEENT: Normocephalic;  conjunctivae and sclerae clear; moist mucous membranes;   NECK: Supple  EXTREMITIES: no cyanosis; no edema; no calf tenderness  SKIN: warm and dry; no rash             Neurological Exam:  - Mental Status: Alert, oriented to person, place, time, situation; speech is fluent with intact naming, repetition, and comprehension  - Cranial Nerves II-XII:    II:  PERRL 3mm b/l; visual fields are full to confrontation  III, IV, VI:  EOMI, no nystagmus  V:  facial sensation is intact in the V1-V3 distribution bilaterally.  VII:  face is symmetric with normal eye closure and smile  VIII:  hearing is intact to voice  IX, X:  uvula is midline and soft palate rises symmetrically  XI:  head turning and shoulder shrug are intact bilaterally  XII:  tongue protrudes in the midline  - Motor: very subtle drift of RLE, otherwise 5/5 for all muscle groups both proximally and distally; normal muscle bulk and tone throughout; no pronator drift  - Reflexes:  2+ and symmetric at the biceps, triceps, brachioradialis, knees, and ankles;  plantar reflexes downgoing bilaterally  - Sensory:  intact to light touch and symmetric throughout, no extinction  - Coordination: R. FTN with dysmetria, no dysmetria on HKS, rapid alternating hand movements intact  - Gait: wide based gait    Other:    05-01    144  |  106  |  18  ----------------------------<  139<H>  4.1   |  25  |  1.12    Ca    9.1      01 May 2021 16:21    TPro  6.9  /  Alb  4.2  /  TBili  0.9  /  DBili  x   /  AST  20  /  ALT  50<H>  /  AlkPhos  75  05-01                            16.2   7.21  )-----------( 261      ( 01 May 2021 16:21 )             47.2       Radiology  CT Brain Stroke Protocol (05.01.21 @ 16:28)   IMPRESSION:    HEAD CT: No acute intracranial hemorrhage, brain edema, or midline shift. Left retrocerebellar arachnoid cyst with associated cerebellar tonsillar herniation and slight effacement of the fourth ventricle without antonio supratentorial hydrocephalus.    If symptoms persist consider follow up head CT or MRI, MRA if no contraindication.    CTA COW:  Patent intracranial circulation without flow limiting stenosis    CTA NECK: Patent, ECAs, ICAs, no hemodynamically significant stenosis at ICA origins by NASCET criteria.  Bilateral vertebral arteries are patent without flow limiting stenosis.

## 2021-05-02 PROBLEM — E78.5 HYPERLIPIDEMIA, UNSPECIFIED: Chronic | Status: ACTIVE | Noted: 2021-04-30

## 2021-05-02 PROBLEM — K43.9 VENTRAL HERNIA WITHOUT OBSTRUCTION OR GANGRENE: Chronic | Status: ACTIVE | Noted: 2021-04-30

## 2021-05-02 PROBLEM — K76.0 FATTY (CHANGE OF) LIVER, NOT ELSEWHERE CLASSIFIED: Chronic | Status: ACTIVE | Noted: 2021-04-30

## 2021-05-02 PROBLEM — J45.909 UNSPECIFIED ASTHMA, UNCOMPLICATED: Chronic | Status: ACTIVE | Noted: 2021-04-30

## 2021-05-02 LAB
COVID-19 SPIKE DOMAIN AB INTERP: POSITIVE
COVID-19 SPIKE DOMAIN ANTIBODY RESULT: >250 U/ML — HIGH
SARS-COV-2 IGG+IGM SERPL QL IA: >250 U/ML — HIGH
SARS-COV-2 IGG+IGM SERPL QL IA: POSITIVE

## 2021-05-02 PROCEDURE — 99223 1ST HOSP IP/OBS HIGH 75: CPT

## 2021-05-02 PROCEDURE — 99232 SBSQ HOSP IP/OBS MODERATE 35: CPT

## 2021-05-02 PROCEDURE — 72141 MRI NECK SPINE W/O DYE: CPT | Mod: 26

## 2021-05-02 PROCEDURE — 70551 MRI BRAIN STEM W/O DYE: CPT | Mod: 26

## 2021-05-02 RX ORDER — DIAZEPAM 5 MG
5 TABLET ORAL ONCE
Refills: 0 | Status: DISCONTINUED | OUTPATIENT
Start: 2021-05-02 | End: 2021-05-02

## 2021-05-02 RX ADMIN — ATORVASTATIN CALCIUM 20 MILLIGRAM(S): 80 TABLET, FILM COATED ORAL at 21:31

## 2021-05-02 RX ADMIN — Medication 5 MILLIGRAM(S): at 18:49

## 2021-05-02 NOTE — PHYSICAL THERAPY INITIAL EVALUATION ADULT - PLANNED THERAPY INTERVENTIONS, PT EVAL
Pt independent with all functional mobility upon eval. No skilled PT needs at this time. PT to sign off and remain available for re-consult if required.

## 2021-05-02 NOTE — PHYSICAL THERAPY INITIAL EVALUATION ADULT - GROSSLY INTACT, SENSORY
Mild impairment in light touch compared to L LE; pt described "slight heaviness" and parathesiaPt/Right LE

## 2021-05-02 NOTE — PROGRESS NOTE ADULT - ASSESSMENT
51M pmhx small CVA in past and known posterior fossa arachnoid cyst (follows with Dr. Ferreira per report 2020 imaging stable) presents with sudden onset R hemiparesis and ataxia. Intact on exam except subjective R sided heaviness and mild RUE/RLE sensory changes. Found to have arachnoid cyst with 1cm Tonsilar herniation, no headaches or UMN signs. Neurology consulted for code stroke -CTA H/N negative for LVO, therefore pt was not a candidate for thrombectomy.      PLAN:  Neuro:   - Neurology consulted- RUE dysmetria, and RLE paresis possibly secondary to interval expansion of patient's known retrocerebellar arachnoid cyst, cannot rule out small acute ischemic infarct of R. cerebellum vs. ataxic hemiparesis secondary to ischemia of post. limb of internal capsule vs. basis pontis.   - MRI C spine and head pending  - CTA brain - wnl  Respiratory:   - room air  CV:  - vitals stable  DVT ppx:   - venodynes  - LE dopp -pending  PT/OT:   - no PT needs      [facility] # [id number] 51M pmhx small CVA in past and known posterior fossa arachnoid cyst (follows with Dr. Ferreira per report 2020 imaging stable) presents with sudden onset R hemiparesis and ataxia. Intact on exam except subjective R sided heaviness and mild RUE/RLE sensory changes. Found to have arachnoid cyst with 1cm Tonsilar herniation, no headaches or UMN signs. Neurology consulted for code stroke -CTA H/N negative for LVO, therefore pt was not a candidate for thrombectomy.      PLAN:  Neuro:   - Neurology consulted- RUE dysmetria, and RLE paresis possibly secondary to interval expansion of patient's known retrocerebellar arachnoid cyst, cannot rule out small acute ischemic infarct of R. cerebellum vs. ataxic hemiparesis secondary to ischemia of post. limb of internal capsule vs. basis pontis.   - MRI C spine and head pending  - CTA brain - wnl  Respiratory:   - room air  CV:  - interrogate loop recorder  - vitals stable  DVT ppx:   - venodynes  - LE dopp -pending  PT/OT:   - no PT needs      [facility] # [id number]

## 2021-05-02 NOTE — PHYSICAL THERAPY INITIAL EVALUATION ADULT - PRECAUTIONS/LIMITATIONS, REHAB EVAL
HEAD CT: No acute intracranial hemorrhage, brain edema, or midline shift. Left retrocerebellar arachnoid cyst with associated cerebellar tonsillar herniation and slight effacement of the fourth ventricle without antonio supratentorial hydrocephalus. If symptoms persist consider follow up head CT or MRI, MRA if no contraindication. CTA COW:  Patent intracranial circulation without flow limiting stenosis CTA NECK: Patent, ECAs, ICAs, no hemodynamically significant stenosis at ICA origins by NASCET criteria. Bilateral vertebral arteries are patent without flow limiting stenosis./no known precautions/limitations

## 2021-05-02 NOTE — PHYSICAL THERAPY INITIAL EVALUATION ADULT - PERTINENT HX OF CURRENT PROBLEM, REHAB EVAL
51M pmhx small CVA in past and known posterior fossa arachnoid cyst (follows with Dr. Ferreira per report 2020 imaging stable) presents with sudden onset R hemiparesis and ataxia. Intact on exam except subjective R sided heaviness. Found to have arachnoid cyst with 1cm Tonsilar herniation, no headaches or UMN signs.
Patient with one or more new problems requiring additional work-up/treatment.

## 2021-05-02 NOTE — CHART NOTE - NSCHARTNOTEFT_GEN_A_CORE
CAPRINI SCORE [CLOT] Score on Admission for     AGE RELATED RISK FACTORS                                                       MOBILITY RELATED FACTORS  [x ] Age 41-60 years                                            (1 Point)                  [ ] Bed rest                                                        (1 Point)  [ ] Age: 61-74 years                                           (2 Points)                 [ ] Plaster cast                                                   (2 Points)  [ ] Age= 75 years                                              (3 Points)                 [ ] Bed bound for more than 72 hours                 (2 Points)    DISEASE RELATED RISK FACTORS                                               GENDER SPECIFIC FACTORS  [ ] Edema in the lower extremities                       (1 Point)                  [ ] Pregnancy                                                     (1 Point)  [ ] Varicose veins                                               (1 Point)                  [ ] Post-partum < 6 weeks                                   (1 Point)             [ x] BMI > 25 Kg/m2                                            (1 Point)                  [ ] Hormonal therapy  or oral contraception          (1 Point)                 [ ] Sepsis (in the previous month)                        (1 Point)                  [ ] History of pregnancy complications                 (1 point)  [ ] Pneumonia or serious lung disease                                               [ ] Unexplained or recurrent                     (1 Point)           (in the previous month)                               (1 Point)  [ ] Abnormal pulmonary function test                     (1 Point)                 SURGERY RELATED RISK FACTORS (include planned surgeries)  [ ] Acute myocardial infarction                              (1 Point)                 [ ]  Section                                             (1 Point)  [ ] Congestive heart failure (in the previous month)  (1 Point)         [ ] Minor surgery                                                  (1 Point)   [ ] Inflammatory bowel disease                             (1 Point)                 [ ] Arthroscopic surgery                                        (2 Points)  [ ] Central venous access                                      (2 Points)                [ ] General surgery lasting more than 45 minutes   (2 Points)       [ ] Stroke (in the previous month)                          (5 Points)               [ ] Elective arthroplasty                                         (5 Points)            [ ] current or past malignancy                              (2 Points)                                                                                                       HEMATOLOGY RELATED FACTORS                                                 TRAUMA RELATED RISK FACTORS  [ ] Prior episodes of VTE                                     (3 Points)                [ ] Fracture of the hip, pelvis, or leg                       (5 Points)  [ ] Positive family history for VTE                         (3 Points)                 [ ] Acute spinal cord injury (in the previous month)  (5 Points)  [ ] Prothrombin 07212 A                                     (3 Points)                 [ ] Paralysis  (less than 1 month)                             (5 Points)  [ ] Factor V Leiden                                             (3 Points)                  [ ] Multiple Trauma within 1 month                        (5 Points)  [ ] Lupus anticoagulants                                     (3 Points)                                                           [ ] Anticardiolipin antibodies                               (3 Points)                                                       [ ] High homocysteine in the blood                      (3 Points)                                             [ ] Other congenital or acquired thrombophilia      (3 Points)                                                [ ] Heparin induced thrombocytopenia                  (3 Points)                                          Total Score [     2     ]    Risk:  Very low 0   Low 1 to 2   Moderate 3 to 4   High =5       VTE Prophylasix Recommednations:  [ x] mechanical pneumatic compression devices                                      [ ] contraindicated: _____________________  [ ] chemo prophylasix                                                                                   [ x] contraindicated _________________likely surgery____    **** HIGH LIKELIHOOD DVT PRESENT ON ADMISSION  [ x] (please order LE dopplers within 24 hours of admission)

## 2021-05-02 NOTE — PHYSICAL THERAPY INITIAL EVALUATION ADULT - DIAGNOSIS, PT EVAL
TELEPHONE ENCOUNTER    Date of Encounter: 11-12-20  Reason for Visit: Workers Compensation Visit  Employer: [employer] ( ALL SITES)  Date of Injury: 11/9/2020    HISTORY OF PRESENT ILLNESS:    Sonja Lowe is a 24 year old female, who presents for follow up of an injury to her left wrist  that occurred at work on 11/9/2020.      She reports that symptoms have improved. She states the swelling has resolved. The pain is less intense. However, she continues to have pain and does not feel she would be able to tolerate full duty at this time. She is concerned about assisting in patient transfers.       With regard to this injury, she is taking the following medications: no ibuprofen since monday      Functional status: She has been off work as she has been previously scheduled to be off    REVIEW OF SYSTEMS: Review of systems is performed and findings relevant to this injury are included in the HPI.    PAST, FAMILY, SOCIAL HISTORY:   Reviewed in the records and with the patient.    TOBACCO HISTORY:  The patient's tobacco history is reviewed in the record.  CURRENT MEDICATIONS and ALLERGIES are reviewed in the record.  PROBLEM LIST is reviewed in the record.      DIAGNOSTICS: none      DIAGNOSIS:  Sprain of left wrist, subsequent encounter  (primary encounter diagnosis)  -Sonja's left wrist continues to be painful. Her tolerance is impaired and she is unable to return to full duty at this time. Work restrictions continued. Follow up in person in one week to re-evaluate.      RESTRICTIONS:  Right hand work only. May wear OTC brace on left wrist as needed. Avoid IDHL (immediately dangerous to health or life) situations. No patient transfers.     Anticipated Maximum Medical Improvement: 3 weeks      FOLLOW-UP:  Return in about 1 week (around 11/19/2020).    She is to follow-up sooner if her symptoms should get worse.     The patient's employer was notified.     See Return to Work Report for further  information.    Electronically  signed by Melva Cloud PA-C  11/12/2020    [place] [employer] [facility]    The physician below agrees with the plan and restrictions placed on the patient by the provider above.  Chencho Mora MD    This visit is being performed via phone to discuss Worker's Compensation (LEFT WRIST) and Telephonic Visit    Clinician Location: [facility]    Sonja is in [place] and her identity has been established.   She was informed that consent to treat includes permission to submit charges to the applicable insurance on file. Sonja was advised regarding the potential risk inherent in video visits, as the assessment may be limited due to what can be seen on the screen which potentially results in an incomplete assessment; as well as either of us may discontinue the video visit if it is felt that the videoconferencing connections are not adequate for his/her situation.   5-10 minutes were spent in this encounter.     Unsteady gait, R LE parathesia

## 2021-05-03 ENCOUNTER — TRANSCRIPTION ENCOUNTER (OUTPATIENT)
Age: 51
End: 2021-05-03

## 2021-05-03 VITALS
RESPIRATION RATE: 18 BRPM | TEMPERATURE: 98 F | DIASTOLIC BLOOD PRESSURE: 70 MMHG | HEART RATE: 62 BPM | OXYGEN SATURATION: 95 % | SYSTOLIC BLOOD PRESSURE: 112 MMHG

## 2021-05-03 PROCEDURE — 82962 GLUCOSE BLOOD TEST: CPT

## 2021-05-03 PROCEDURE — 84484 ASSAY OF TROPONIN QUANT: CPT

## 2021-05-03 PROCEDURE — U0003: CPT

## 2021-05-03 PROCEDURE — 99239 HOSP IP/OBS DSCHRG MGMT >30: CPT

## 2021-05-03 PROCEDURE — 85025 COMPLETE CBC W/AUTO DIFF WBC: CPT

## 2021-05-03 PROCEDURE — 99291 CRITICAL CARE FIRST HOUR: CPT

## 2021-05-03 PROCEDURE — 72141 MRI NECK SPINE W/O DYE: CPT

## 2021-05-03 PROCEDURE — 82803 BLOOD GASES ANY COMBINATION: CPT

## 2021-05-03 PROCEDURE — 85730 THROMBOPLASTIN TIME PARTIAL: CPT

## 2021-05-03 PROCEDURE — 86769 SARS-COV-2 COVID-19 ANTIBODY: CPT

## 2021-05-03 PROCEDURE — 70551 MRI BRAIN STEM W/O DYE: CPT

## 2021-05-03 PROCEDURE — 70498 CT ANGIOGRAPHY NECK: CPT

## 2021-05-03 PROCEDURE — 70496 CT ANGIOGRAPHY HEAD: CPT

## 2021-05-03 PROCEDURE — 93970 EXTREMITY STUDY: CPT

## 2021-05-03 PROCEDURE — 93291 INTERROG DEV EVAL SCRMS IP: CPT | Mod: 26

## 2021-05-03 PROCEDURE — 97161 PT EVAL LOW COMPLEX 20 MIN: CPT

## 2021-05-03 PROCEDURE — 93306 TTE W/DOPPLER COMPLETE: CPT

## 2021-05-03 PROCEDURE — 93306 TTE W/DOPPLER COMPLETE: CPT | Mod: 26

## 2021-05-03 PROCEDURE — 80053 COMPREHEN METABOLIC PANEL: CPT

## 2021-05-03 PROCEDURE — 70450 CT HEAD/BRAIN W/O DYE: CPT

## 2021-05-03 PROCEDURE — 93970 EXTREMITY STUDY: CPT | Mod: 26

## 2021-05-03 PROCEDURE — U0005: CPT

## 2021-05-03 PROCEDURE — 85610 PROTHROMBIN TIME: CPT

## 2021-05-03 RX ORDER — ASPIRIN/CALCIUM CARB/MAGNESIUM 324 MG
1 TABLET ORAL
Qty: 0 | Refills: 0 | DISCHARGE
Start: 2021-05-03

## 2021-05-03 RX ORDER — CLOPIDOGREL BISULFATE 75 MG/1
1 TABLET, FILM COATED ORAL
Qty: 30 | Refills: 0
Start: 2021-05-03 | End: 2021-06-01

## 2021-05-03 RX ORDER — CLOPIDOGREL BISULFATE 75 MG/1
75 TABLET, FILM COATED ORAL DAILY
Refills: 0 | Status: DISCONTINUED | OUTPATIENT
Start: 2021-05-03 | End: 2021-05-03

## 2021-05-03 RX ORDER — ASPIRIN/CALCIUM CARB/MAGNESIUM 324 MG
81 TABLET ORAL DAILY
Refills: 0 | Status: DISCONTINUED | OUTPATIENT
Start: 2021-05-03 | End: 2021-05-03

## 2021-05-03 RX ORDER — ACETAMINOPHEN 500 MG
2 TABLET ORAL
Qty: 0 | Refills: 0 | DISCHARGE
Start: 2021-05-03

## 2021-05-03 RX ADMIN — Medication 81 MILLIGRAM(S): at 12:31

## 2021-05-03 RX ADMIN — CLOPIDOGREL BISULFATE 75 MILLIGRAM(S): 75 TABLET, FILM COATED ORAL at 12:31

## 2021-05-03 NOTE — DISCHARGE NOTE PROVIDER - HOSPITAL COURSE
51M pmhx small CVA in past and known posterior fossa arachnoid cyst (follows with Dr. Ferreira per report 2020 imaging stable) presents with sudden onset R hemiparesis and ataxia. Intact on exam except subjective R sided heaviness and mild RUE/RLE sensory changes. Found to have arachnoid cyst with 1cm Tonsilar herniation, no headaches or UMN signs. Neurology consulted for code stroke -CTA H/N negative for LVO, therefore pt was not a candidate for thrombectomy. MRI showed L punctate corona radiata infarct, for which neurology stroke service was asked to further evaluate. Was initially placed on DAPT per [facility] protocol. Loop recorder was interrogated..... ECHO was performed.....  Neurology follow up for acute stroke, start asa 81 mg and plavix 75 mg daily for 3 weeks, followed specifically by Plavix 75mg PO daily indefinitely, due to likely failing home ASA. Patient to follow up with Dr Ferreira as outpatient. On day of discharge patient is medically and neurologically stable. No PT needs.        More than 30 minutes spent on total encounter: more than 50% of the visit was spent on educating the patient and family regarding condition, medications, follow up plans, signs and symptoms to be concerned with, preparing paperwork, and questions answered regarding discharge.       51M pmhx small CVA in past and known posterior fossa arachnoid cyst (follows with Dr. Ferreira per report 2020 imaging stable) presents with sudden onset R hemiparesis and ataxia. Intact on exam except subjective R sided heaviness and mild RUE/RLE sensory changes. Found to have arachnoid cyst with 1cm Tonsilar herniation, no headaches or UMN signs. Neurology consulted for code stroke -CTA H/N negative for LVO, therefore pt was not a candidate for thrombectomy. MRI showed L punctate corona radiata infarct, for which neurology stroke service was asked to further evaluate. Was initially placed on DAPT per [facility] protocol. Loop recorder was interrogated and no events were detected. ECHO was performed. LE doppler were negative for DVT.  Neurology follow up for acute stroke, start asa 81 mg and plavix 75 mg daily for 3 weeks, followed specifically by Plavix 75mg PO daily indefinitely, due to likely failing home ASA. Patient to follow up with Dr Ferreira as outpatient. On day of discharge patient is medically and neurologically stable. No PT needs.        More than 30 minutes spent on total encounter: more than 50% of the visit was spent on educating the patient and family regarding condition, medications, follow up plans, signs and symptoms to be concerned with, preparing paperwork, and questions answered regarding discharge.       51M pmhx small CVA in past and known posterior fossa arachnoid cyst (follows with Dr. Ferreira per report 2020 imaging stable) presents with sudden onset R hemiparesis and ataxia. Intact on exam except subjective R sided heaviness and mild RUE/RLE sensory changes. Found to have arachnoid cyst with 1cm Tonsilar herniation, no headaches or UMN signs. Neurology consulted for code stroke -CTA H/N negative for LVO, therefore pt was not a candidate for thrombectomy. MRI showed L punctate corona radiata infarct, for which neurology stroke service was asked to further evaluate. Was initially placed on DAPT per [facility] protocol. Loop recorder was interrogated and no events were detected. ECHO was performed and was wnl. LE doppler were negative for DVT.  Neurology follow up for acute stroke, start asa 81 mg and plavix 75 mg daily for 3 weeks, followed specifically by Plavix 75mg PO daily indefinitely, due to likely failing home ASA. Patient to follow up with Dr Ferreira as outpatient. On day of discharge patient is medically and neurologically stable. No PT needs.        More than 30 minutes spent on total encounter: more than 50% of the visit was spent on educating the patient and family regarding condition, medications, follow up plans, signs and symptoms to be concerned with, preparing paperwork, and questions answered regarding discharge.

## 2021-05-03 NOTE — CONSULT NOTE ADULT - TIME BILLING
-review of the history and records  -general and neurological examination  -generation of assessment and treatment plan  -communication with the patient/family members  -coordination of care.

## 2021-05-03 NOTE — DISCHARGE NOTE PROVIDER - NSDCMRMEDTOKEN_GEN_ALL_CORE_FT
acetaminophen 325 mg oral tablet: 2 tab(s) orally every 6 hours, As needed, Mild Pain (1 - 3)  aspirin 81 mg oral tablet, chewable: 1 tab(s) orally once a day  clopidogrel 75 mg oral tablet: 1 tab(s) orally once a day  Crestor 5 mg oral tablet: 1 tab(s) orally once a day  Ventolin HFA 90 mcg/inh inhalation aerosol: 2 puff(s) inhaled As Needed

## 2021-05-03 NOTE — DISCHARGE NOTE PROVIDER - NSDCCPCAREPLAN_GEN_ALL_CORE_FT
PRINCIPAL DISCHARGE DIAGNOSIS  Diagnosis: Right sided weakness  Assessment and Plan of Treatment: MRI brain shows left corona radiata infarct. Continue aspirin 81 mg with plavix 75 mg x 3 weeks then stop aspirin and continue plavix. Follow up with Dr Ferreira in the office. Continue statin.

## 2021-05-03 NOTE — PROGRESS NOTE ADULT - ASSESSMENT
51M pmhx small CVA in past and known posterior fossa arachnoid cyst (follows with Dr. Ferreira per report 2020 imaging stable) presents with sudden onset R hemiparesis and ataxia. Intact on exam except subjective R sided heaviness and mild RUE/RLE sensory changes. Found to have arachnoid cyst with 1cm Tonsilar herniation, no headaches or UMN signs. Neurology consulted for code stroke -CTA H/N negative for LVO, therefore pt was not a candidate for thrombectomy.  MRI showed L punctate corona radiata infarct, for which neurology stroke service was asked to further evaluate. Was initially placed on DAPT per [facility] protocol.       PLAN:  Neuro:   - Neurology follow up today for acute stroke, start asa 81 mg and plavix 75 mg daily for 3 weeks, followed specifically by Plavix 75mg PO daily indefinitely, due to likely failing home ASA  - MRI C spine and head - shows acute left corona radiata infarct  - CTA brain - wnl  - ECHO-pending  - interrogate loop recorder  - f/u with Dr Ferreira as outpatient  Respiratory:   - room air  CV:  - interrogate loop recorder  - vitals stable  DVT ppx:   - venodynes  - LE dopp -pending  PT/OT:   - no PT needs    More than 30 minutes spent on total encounter: more than 50% of the visit was spent on educating the patient and family regarding condition, medications, follow up plans, signs and symptoms to be concerned with, preparing paperwork, and questions answered regarding discharge.    DocSea # [id number]         External [facility]/External [facility]

## 2021-05-03 NOTE — CONSULT NOTE ADULT - SUBJECTIVE AND OBJECTIVE BOX
HPI:  Diallo Covington  51M pmhx small CVA in past and known posterior fossa arachnoid cyst (follows with Dr. Ferreira per report 2020 imaging stable) presents with sudden onset R hemiparesis and ataxia. Intact on exam except subjective R sided heaviness and mild RUE/RLE sensory changes. Found to have arachnoid cyst with 1cm Tonsilar herniation, no headaches or UMN signs.   (01 May 2021 18:51)    (Stroke only)  NIHSS: 0  pre-MRS: 0    REVIEW OF SYSTEMS  General:	  Skin/Breast:	  Ophthalmologic:  ENMT:	  Respiratory and Thorax:	  Cardiovascular:	  Gastrointestinal:	  Genitourinary:	  Musculoskeletal:	  Neurological:	  Psychiatric:	  Hematology/Lymphatics:	  Endocrine:	  Allergic/Immunologic:	    A 10-system ROS was performed and is negative except for those items noted above and/or in the HPI.    PAST MEDICAL & SURGICAL HISTORY:  CVA (cerebral vascular accident)  on MRI    Hiatal hernia    Hyperlipidemia    Asthma    Ventral hernia without obstruction or gangrene    Fatty liver    H/O hiatal hernia  2006 fundoplication      FAMILY HISTORY:  Family history of hypertension (Father)      SOCIAL HISTORY:   T/E/D:   Occupation:   Lives with:     MEDICATIONS (HOME):  Home Medications:  Crestor 5 mg oral tablet: 1 tab(s) orally once a day (30 Apr 2021 17:32)  Ventolin HFA 90 mcg/inh inhalation aerosol: 2 puff(s) inhaled As Needed (30 Apr 2021 17:32)    MEDICATIONS  (STANDING):  aspirin  chewable 81 milliGRAM(s) Oral daily  atorvastatin 20 milliGRAM(s) Oral at bedtime  clopidogrel Tablet 75 milliGRAM(s) Oral daily    MEDICATIONS  (PRN):  acetaminophen   Tablet .. 650 milliGRAM(s) Oral every 6 hours PRN Temp greater or equal to 38C (100.4F), Mild Pain (1 - 3)  ALBUTerol    90 MICROgram(s) HFA Inhaler 2 Puff(s) Inhalation every 6 hours PRN Shortness of Breath and/or Wheezing    ALLERGIES/INTOLERANCES:  Allergies  Cats (Unknown)  Dogs (Unknown)  No Known Drug Allergies  Pollen (Sneezing)  Ragweed (Unknown)    Intolerances    VITALS & EXAMINATION:  Vital Signs Last 24 Hrs  T(C): 36.6 (03 May 2021 07:00), Max: 36.9 (02 May 2021 11:00)  T(F): 97.9 (03 May 2021 07:00), Max: 98.4 (02 May 2021 11:00)  HR: 67 (03 May 2021 07:00) (65 - 74)  BP: 123/78 (03 May 2021 07:00) (108/64 - 126/73)  BP(mean): --  RR: 18 (03 May 2021 07:00) (18 - 18)  SpO2: 95% (03 May 2021 07:00) (93% - 99%)    General:  Constitutional: Obese Male, appears stated age, in no apparent distress including pain  Head: Normocephalic & atraumatic.  ENT: Patent ear canals, intact TM, mucus membranes moist & pink, neck supple, no lymphadenopathy.   Respiratory: Patent airway. All lung fields are clear to auscultation bilaterally.  Extremities: No cyanosis, clubbing, or edema.  Skin: No rashes, bruising, or discoloration.    Cardiovascular (>2): RRR no murmurs. Carotid pulsations symmetric, no bruits. Normal capillary beds refill, 1-2 seconds or less.     Neurological (>12):  MS: Awake, alert, oriented to person, place, situation, time. Normal affect. Follows all commands.    Language: Speech is clear, fluent with good repetition & comprehension (able to name objects___)    CNs: PERRLA (R = 3mm, L = 3mm). VFF. EOMI no nystagmus, no diplopia. V1-3 intact to LT/pinprick, well developed masseter muscles b/l. No facial asymmetry b/l, full eye closure strength b/l. Hearing grossly normal (rubbing fingers) b/l. Symmetric palate elevation in midline. Gag reflex deferred. Head turning & shoulder shrug intact b/l. Tongue midline, normal movements, no atrophy.    Fundoscopic: pale w/ sharp discs margins No vascular changes.      Motor: Normal muscle bulk & tone. No noticeable tremor or seizure. No pronator drift.              Deltoid	Biceps	Triceps	Wrist	Finger ABd	   R	5	5	5	5	5		5 	  L	5	5	5	5	5		5    	H-Flex	H-Ext	H-ABd	H-ADd	K-Flex	K-Ext	D-Flex	P-Flex  R	5	5	5	5	5	5	5	5 	   L	5	5	5	5	5	5	5	5	     Sensation: Intact to LT/PP/Temp/Vibration/Position b/l throughout.     Cortical: Extinction on DSS (neglect): none    Reflexes:              Biceps(C5)       BR(C6)     Triceps(C7)               Patellar(L4)    Achilles(S1)    Plantar Resp  R	2	          2	             2		        2		    2		Down   L	2	          2	             2		        2		    2		Down     Coordination: intact rapid-alt movements. No dysmetria to FTN/HTS    Gait: Normal Romberg. No postural instability. Normal stance and tandem gait.     LABORATORY:  CBC                       16.2   7.21  )-----------( 261      ( 01 May 2021 16:21 )             47.2     Chem 05-01    144  |  106  |  18  ----------------------------<  139<H>  4.1   |  25  |  1.12    Ca    9.1      01 May 2021 16:21    TPro  6.9  /  Alb  4.2  /  TBili  0.9  /  DBili  x   /  AST  20  /  ALT  50<H>  /  AlkPhos  75  05-01    LFTs LIVER FUNCTIONS - ( 01 May 2021 16:21 )  Alb: 4.2 g/dL / Pro: 6.9 g/dL / ALK PHOS: 75 U/L / ALT: 50 U/L / AST: 20 U/L / GGT: x           Coagulopathy PT/INR - ( 01 May 2021 16:21 )   PT: 11.5 sec;   INR: 0.96 ratio         PTT - ( 01 May 2021 16:21 )  PTT:33.0 sec  Lipid Panel   A1c   Cardiac enzymes     U/A   CSF  Immunological  Other    STUDIES & IMAGING:  Studies (EKG, EEG, EMG, etc):     Radiology (XR, CT, MR, U/S, TTE/MADIE): HPI: 52 y/o RH man with PMHx asthma, HLD on Crestor, ?TIAs s/p recent Loop recorder placement, retrocerebellar arachnoid cyst being followed by Dr. Chandler with annual MRIs (imaging in Oct 2020 was stable) presented as code stroke for RUE paresthesias and RLE weakness described as "heaviness". LKN 3:00pm, preMRS 0, NIHSS 1 for RLE drift. tPA was not given as pt reported symptoms were rapidly improving and he had no disabling deficits on initial evaluation. CTH demonstrated retrocerebellar arachnoid cyst with mass effect, tonsillar herniation and partial effacement of 4th ventricle. CTA H/N negative for LVO, therefore pt was not a candidate for thrombectomy. Initial impression was gait ataxia, RLE paresis possibly secondary to interval expansion of patient's known retrocerebellar arachnoid cyst however most recent MRI showed L punctate corona radiata infarct, for which neurology stroke service was asked to further evaluate. Was initially placed on DAPT per [facility] protocol.     (Stroke only)  Initial NIHSS: 1  pre-MRS: 0    REVIEW OF SYSTEMS  As per HPI    PAST MEDICAL & SURGICAL HISTORY:  CVA (cerebral vascular accident)  on MRI    Hiatal hernia    Hyperlipidemia    Asthma    Ventral hernia without obstruction or gangrene    Fatty liver    H/O hiatal hernia  2006 fundoplication      FAMILY HISTORY:  Family history of hypertension (Father)      SOCIAL HISTORY:   T/E/D:   Occupation:   Lives with:     MEDICATIONS (HOME):  Home Medications:  Crestor 5 mg oral tablet: 1 tab(s) orally once a day (30 Apr 2021 17:32)  Ventolin HFA 90 mcg/inh inhalation aerosol: 2 puff(s) inhaled As Needed (30 Apr 2021 17:32)    MEDICATIONS  (STANDING):  aspirin  chewable 81 milliGRAM(s) Oral daily  atorvastatin 20 milliGRAM(s) Oral at bedtime  clopidogrel Tablet 75 milliGRAM(s) Oral daily    MEDICATIONS  (PRN):  acetaminophen   Tablet .. 650 milliGRAM(s) Oral every 6 hours PRN Temp greater or equal to 38C (100.4F), Mild Pain (1 - 3)  ALBUTerol    90 MICROgram(s) HFA Inhaler 2 Puff(s) Inhalation every 6 hours PRN Shortness of Breath and/or Wheezing    ALLERGIES/INTOLERANCES:  Allergies  Cats (Unknown)  Dogs (Unknown)  No Known Drug Allergies  Pollen (Sneezing)  Ragweed (Unknown)    Intolerances    VITALS & EXAMINATION:  Vital Signs Last 24 Hrs  T(C): 36.6 (03 May 2021 07:00), Max: 36.9 (02 May 2021 11:00)  T(F): 97.9 (03 May 2021 07:00), Max: 98.4 (02 May 2021 11:00)  HR: 67 (03 May 2021 07:00) (65 - 74)  BP: 123/78 (03 May 2021 07:00) (108/64 - 126/73)  BP(mean): --  RR: 18 (03 May 2021 07:00) (18 - 18)  SpO2: 95% (03 May 2021 07:00) (93% - 99%)    General:  Constitutional: Male, appears stated age, in no apparent distress including pain  Head: Normocephalic & atraumatic.    Neurological (>12):  MS: Awake, alert, oriented to person, place, situation, time. Normal affect. Follows all commands.    Language: Speech is clear, fluent with good repetition & comprehension    CNs: PERRLA (R = 3mm, L = 3mm). VFF. EOMI. No facial asymmetry b/l, full eye closure strength b/l. Hearing grossly normal to conversation. Gag reflex deferred. Head turning & shoulder shrug intact b/l. Tongue midline, normal movements.    Motor: Normal muscle bulk & tone. No noticeable tremor or seizure. No pronator drift.    Sensation: Intact to LT b/l throughout.     Cortical: Extinction on DSS (neglect): none    Coordination: No dysmetria to FTN    Gait: No gross postural instability    LABORATORY:  CBC                       16.2   7.21  )-----------( 261      ( 01 May 2021 16:21 )             47.2     Chem 05-01    144  |  106  |  18  ----------------------------<  139<H>  4.1   |  25  |  1.12    Ca    9.1      01 May 2021 16:21    TPro  6.9  /  Alb  4.2  /  TBili  0.9  /  DBili  x   /  AST  20  /  ALT  50<H>  /  AlkPhos  75  05-01    LFTs LIVER FUNCTIONS - ( 01 May 2021 16:21 )  Alb: 4.2 g/dL / Pro: 6.9 g/dL / ALK PHOS: 75 U/L / ALT: 50 U/L / AST: 20 U/L / GGT: x           Coagulopathy PT/INR - ( 01 May 2021 16:21 )   PT: 11.5 sec;   INR: 0.96 ratio         PTT - ( 01 May 2021 16:21 )  PTT:33.0 sec  Lipid Panel   A1c   Cardiac enzymes     U/A   CSF  Immunological  Other    STUDIES & IMAGING:  Studies (EKG, EEG, EMG, etc):     Radiology (XR, CT, MR, U/S, TTE/MADIE):  Brain MRI: Acute left corona radiata infarct. Central left-sided retrocerebellar arachnoid cyst with inferior displacement of the cerebellar tonsils.    Cervical spine MRI: No evidence for spinal cord abnormality. Multilevel degenerative changes.

## 2021-05-03 NOTE — CONSULT NOTE ADULT - ATTENDING COMMENTS
Briefly 52 yo RH  M with HLD, asthma, TIA s/p ILR, retrocerebellar acahnoid cyst follows with Dr. Ferreira came as code stroke for RUE heaviness.  tpa not given for mild non disabeling symptoms.  CTH with arachnoid cyst with mass effect and partial effacement of 4th, CTA H/N neg.  MRI with puncate small vessel infarct found in L CR.   Etiology small vessel  - Dual antiplatelet therapy with ASA 81mg PO daily and Clopidogrel 75mg PO daily x 3 weeks followed by monotherapy with ASA 81mg PO daily.  - High dose statin therapy - atorvastatin 40mg PO daily. LDL goal <70mg/dL.  - Hemoglobin A1c and lipid panel  - TTE  - telemetry  - PT/OT/SS/SLP, OOBC  - permissive HTN, -180mmHg 24hrs now can be normotensive   - check FS, glucose control <180  - GI/DVT ppx  - Counseling on diet, exercise, and medication adherence was done  - Counseling on smoking cessation and alcohol consumption offered when appropriate.  - Pain assessed and judicious use of narcotics when appropriate was discussed.    - Stroke education given when appropriate.  - Importance of fall prevention discussed.   - Differential diagnosis and plan of care discussed with patient and/or family and primary team  - Thank you for allowing me to participate in the care of this patient. Call with questions.   Aime Hartley MD  Vascular Neurology  Office: [phone]
Hx outlined above. Patient with acute on set right arm weakness and numbness and right leg incoordination. His symptoms are improving and arm no longer feels heavy.     On exam, There is no dysarthria. Eye movements are normal. 5/5 strength throughout. Mild dysmetria on R FNF. Gait is unsteady and mildly wide base. Mild right disdiachokinesis.     HCT reviewed: Large L retrocerebellar arachnoid cyst     Imp: r/o acute vascular event. ? Changes in arachnoid cyst with brainstem dysfunction  Brain MRI  Cardiac eval of loop recorder.   will f/u

## 2021-05-03 NOTE — PROGRESS NOTE ADULT - SUBJECTIVE AND OBJECTIVE BOX
SUBJECTIVE: Pt seen and examined, ambulating hallway with PT with no assistive device. He reports some dizziness when sitting up this morning but it resolved quickly.     OVERNIGHT EVENTS: none    Vital Signs Last 24 Hrs  T(C): 36.9 (02 May 2021 11:00), Max: 37 (01 May 2021 19:20)  T(F): 98.4 (02 May 2021 11:00), Max: 98.6 (01 May 2021 19:20)  HR: 68 (02 May 2021 11:00) (62 - 82)  BP: 126/73 (02 May 2021 11:00) (111/72 - 140/80)  BP(mean): --  RR: 18 (02 May 2021 11:00) (16 - 20)  SpO2: 98% (02 May 2021 11:00) (96% - 100%)    PHYSICAL EXAM:    General: No Acute Distress     Neurological: Awake, alert oriented to person, place and time, Following Commands, PERRL, EOMI, Face Symmetrical, Speech Fluent, Moving all extremities, RUE/RLE 4+/5, No Drift, Sensation to Light Touch Intact    Pulmonary: Clear to Auscultation, No Rales, No Rhonchi, No Wheezes     Cardiovascular: S1, S2, Regular Rate and Rhythm     Gastrointestinal: Soft, Nontender, Nondistended     Incision: none    LABS:                        16.2   7.21  )-----------( 261      ( 01 May 2021 16:21 )             47.2    05-01    144  |  106  |  18  ----------------------------<  139<H>  4.1   |  25  |  1.12    Ca    9.1      01 May 2021 16:21    TPro  6.9  /  Alb  4.2  /  TBili  0.9  /  DBili  x   /  AST  20  /  ALT  50<H>  /  AlkPhos  75  05-01  PT/INR - ( 01 May 2021 16:21 )   PT: 11.5 sec;   INR: 0.96 ratio         PTT - ( 01 May 2021 16:21 )  PTT:33.0 sec      DRAINS:     MEDICATIONS:  Antibiotics:    Neuro:  acetaminophen   Tablet .. 650 milliGRAM(s) Oral every 6 hours PRN Temp greater or equal to 38C (100.4F), Mild Pain (1 - 3)    Cardiac:    Pulm:  ALBUTerol    90 MICROgram(s) HFA Inhaler 2 Puff(s) Inhalation every 6 hours PRN Shortness of Breath and/or Wheezing    GI/:    Other:   atorvastatin 20 milliGRAM(s) Oral at bedtime    DIET: [x] Regular [] CCD [] Renal [] Puree [] Dysphagia [] Tube Feeds:     IMAGING:   < from: CT Angio Head w/ IV Cont (05.01.21 @ 16:35) >  IMPRESSION:    HEAD CT: No acute intracranial hemorrhage, brain edema, or midline shift. Left retrocerebellar arachnoid cyst with associated cerebellar tonsillar herniation and slight effacement of the fourth ventricle without antonio supratentorial hydrocephalus.    If symptoms persist consider follow up head CT or MRI, MRA if no contraindication.    CTA COW:  Patent intracranial circulation without flow limiting stenosis    CTA NECK: Patent, ECAs, ICAs, no hemodynamically significant stenosis at ICA origins by NASCET criteria.  Bilateral vertebral arteries are patent without flow limiting stenosis.    < end of copied text >  
SUBJECTIVE: Pt seen and examined, had MRI head last night which showed left corona radiata infarct, right sided weakness has almost completely resolved    OVERNIGHT EVENTS: none    Vital Signs Last 24 Hrs  T(C): 36.8 (03 May 2021 11:00), Max: 36.8 (02 May 2021 16:08)  T(F): 98.2 (03 May 2021 11:00), Max: 98.3 (02 May 2021 20:27)  HR: 72 (03 May 2021 11:00) (65 - 74)  BP: 134/76 (03 May 2021 11:00) (108/64 - 134/76)  BP(mean): --  RR: 18 (03 May 2021 11:00) (18 - 18)  SpO2: 98% (03 May 2021 11:00) (93% - 99%)    PHYSICAL EXAM:    General: No Acute Distress     Neurological: Awake, alert oriented to person, place and time, Following Commands, PERRL, EOMI, Face Symmetrical, Speech Fluent, Moving all extremities, RUE/RLE 5/5 today, No Drift, Sensation to Light Touch Intact    Pulmonary: Clear to Auscultation, No Rales, No Rhonchi, No Wheezes     Cardiovascular: S1, S2, Regular Rate and Rhythm     Gastrointestinal: Soft, Nontender, Nondistended     Incision: none    LABS:                        16.2   7.21  )-----------( 261      ( 01 May 2021 16:21 )             47.2    05-01    144  |  106  |  18  ----------------------------<  139<H>  4.1   |  25  |  1.12    Ca    9.1      01 May 2021 16:21    TPro  6.9  /  Alb  4.2  /  TBili  0.9  /  DBili  x   /  AST  20  /  ALT  50<H>  /  AlkPhos  75  05-01  PT/INR - ( 01 May 2021 16:21 )   PT: 11.5 sec;   INR: 0.96 ratio         PTT - ( 01 May 2021 16:21 )  PTT:33.0 sec        MEDICATIONS:  Antibiotics:    Neuro:  acetaminophen   Tablet .. 650 milliGRAM(s) Oral every 6 hours PRN Temp greater or equal to 38C (100.4F), Mild Pain (1 - 3)    Cardiac:    Pulm:  ALBUTerol    90 MICROgram(s) HFA Inhaler 2 Puff(s) Inhalation every 6 hours PRN Shortness of Breath and/or Wheezing    GI/:    Other:   aspirin  chewable 81 milliGRAM(s) Oral daily  atorvastatin 20 milliGRAM(s) Oral at bedtime  clopidogrel Tablet 75 milliGRAM(s) Oral daily    DIET: [] Regular [] CCD [] Renal [] Puree [] Dysphagia [] Tube Feeds:     IMAGING:   < from: MR Head No Cont (05.02.21 @ 20:38) >  Impression:    Brain MRI:  Acute left corona radiata infarct.  Central left-sided retrocerebellar arachnoid cyst with inferior displacement of the cerebellar tonsils.    Cervical spine MRI: No evidence for spinal cord abnormality. Multilevel degenerative changes.    < end of copied text >

## 2021-05-03 NOTE — DISCHARGE NOTE NURSING/CASE MANAGEMENT/SOCIAL WORK - PATIENT PORTAL LINK FT
You can access the FollowMyHealth Patient Portal offered by [facility] by registering at the following website: [email or web address]. By joining [facility]’s FollowMyHealth portal, you will also be able to view your health information using other applications (apps) compatible with our system.

## 2021-05-03 NOTE — DISCHARGE NOTE PROVIDER - CARE PROVIDER_API CALL
Debi Burnett)  [facility] Neurosurgery  General  [address], [address]  [place] [address]  Phone: [phone]  Fax: [phone]  Follow Up Time: 1 week

## 2021-05-03 NOTE — DISCHARGE NOTE PROVIDER - NSDCFUADDINST_GEN_ALL_CORE_FT
Return to ER for fever, chills, nausea or vomiting, any increased weakness sluggishness or change in mental status.   No driving until cleared by Dr Ferreira. Do not return to work until cleared by Dr Ferreira. Do not take advil, aleve or ibuprofen as they can cause bleeding. You can take tylenol as needed for mild pain.

## 2021-05-03 NOTE — CHART NOTE - NSCHARTNOTEFT_GEN_A_CORE
ILR Interrogation:    Device: "[employer] Reveal Linq LNQ11  Indication: Cryptogenic CVA  Parameter Changes: N/A  Events/Observation:  No events detected on date of admission; no true pauses or tachyarrhythmias on event log    Impression/Plan:  Presenting Rhythm: NSR 70's   Normal device function.  No events to correlate with presenting symptoms. Discussed findings with covering RN bedside.      [id number] ILR Interrogation:    Device: [employer] Reveal Linq LNQ11  Indication: Cryptogenic CVA  Parameter Changes: N/A  Events/Observation:  No events detected on date of admission; no true pauses or tachyarrhythmias on event log    Impression/Plan:  Presenting Rhythm: NSR 70's   Normal device function.  No events to correlate with presenting symptoms. Discussed findings with covering RN bedside.  AT/AF burden 0%       [id number]

## 2021-05-03 NOTE — CONSULT NOTE ADULT - ASSESSMENT
Assessment: 50 y/o RH man with PMHx asthma, HLD on Crestor, ?TIAs s/p recent Loop recorder placement, retrocerebellar arachnoid cyst being followed by Dr. Chandler with annual MRIs (imaging in Oct 2020 was stable) presented as code stroke for RUE paresthesias and RLE weakness described as "heaviness". LKN 3:00pm, preMRS 0, NIHSS 1 for RLE drift. tPA was not given as pt reported symptoms were rapidly improving and he had no disabling deficits on initial evaluation. CTH demonstrated retrocerebellar arachnoid cyst with mass effect, tonsillar herniation and partial effacement of 4th ventricle. CTA H/N negative for LVO, therefore pt was not a candidate for thrombectomy. Initial impression was gait ataxia, RLE paresis possibly secondary to interval expansion of patient's known retrocerebellar arachnoid cyst however most recent MRI showed L punctate corona radiata infarct, for which neurology stroke service was asked to further evaluate. Was initially placed on DAPT per [facility] protocol on admission.     R-sided ataxic hemiparesis may be secondary to the L-sided corona radiata infarct with mechanism likely to be small-vessel disease at this time.     (Stroke only)  Initial NIHSS: 1  pre-MRS: 0      Plan:  []ECHO pending, to be done today  []Interrogate ILR  []Continue ASA 81mg PO daily + Plavix 75mg PO daily for 3 weeks, followed specifically by Plavix 75mg PO daily indefinitely, due to likely failing home ASA  []Continue Lipitor 20mg qday  []Q4 neurochecks  []Monitor for signs of cerebral edema/herniation  []NSGY c/s--no intervention at this time  []PT/OT  []Pt to follow up with Dr. Chandler as outpatient upon discharge- able to see pt on Monday if needed    -Management & disposition discussed with neuro attending, Dr. Hartley

## 2021-05-03 NOTE — DISCHARGE NOTE PROVIDER - NSDCFUSCHEDAPPT_GEN_ALL_CORE_FT
NICCI MCKEON ; 05/17/2021 ; HOLLIS Gillespie 1 NICCI Yan ; 05/20/2021 ; CARLO NICHOLAS [facility]

## 2021-05-17 ENCOUNTER — APPOINTMENT (OUTPATIENT)
Dept: NEUROSURGERY | Facility: CLINIC | Age: 51
End: 2021-05-17
Payer: COMMERCIAL

## 2021-05-17 ENCOUNTER — APPOINTMENT (OUTPATIENT)
Dept: NEUROLOGY | Facility: CLINIC | Age: 51
End: 2021-05-17
Payer: COMMERCIAL

## 2021-05-17 ENCOUNTER — APPOINTMENT (OUTPATIENT)
Dept: DISASTER EMERGENCY | Facility: CLINIC | Age: 51
End: 2021-05-17

## 2021-05-17 VITALS
HEIGHT: 74 IN | SYSTOLIC BLOOD PRESSURE: 125 MMHG | HEART RATE: 63 BPM | WEIGHT: 217 LBS | BODY MASS INDEX: 27.85 KG/M2 | DIASTOLIC BLOOD PRESSURE: 80 MMHG

## 2021-05-17 DIAGNOSIS — Z01.818 ENCOUNTER FOR OTHER PREPROCEDURAL EXAMINATION: ICD-10-CM

## 2021-05-17 DIAGNOSIS — G93.0 CEREBRAL CYSTS: ICD-10-CM

## 2021-05-17 DIAGNOSIS — I63.9 CEREBRAL INFARCTION, UNSPECIFIED: ICD-10-CM

## 2021-05-17 PROCEDURE — 99213 OFFICE O/P EST LOW 20 MIN: CPT

## 2021-05-17 PROCEDURE — 99072 ADDL SUPL MATRL&STAF TM PHE: CPT

## 2021-05-17 PROCEDURE — 99214 OFFICE O/P EST MOD 30 MIN: CPT

## 2021-05-17 NOTE — PHYSICAL EXAM
[General Appearance - Alert] : alert [General Appearance - In No Acute Distress] : in no acute distress [General Appearance - Well Nourished] : well nourished [General Appearance - Well-Appearing] : healthy appearing [Person] : oriented to person [Place] : oriented to place [Time] : oriented to time [Cranial Nerves Optic (II)] : visual acuity intact bilaterally,  pupils equal round and reactive to light [Cranial Nerves Oculomotor (III)] : extraocular motion intact [Cranial Nerves Trigeminal (V)] : facial sensation intact symmetrically [Cranial Nerves Facial (VII)] : face symmetrical [Cranial Nerves Vestibulocochlear (VIII)] : hearing was intact bilaterally [Cranial Nerves Accessory (XI - Cranial And Spinal)] : head turning and shoulder shrug symmetric [Cranial Nerves Hypoglossal (XII)] : there was no tongue deviation with protrusion [Motor Tone] : muscle tone was normal in all four extremities [Motor Strength] : muscle strength was normal in all four extremities [Sensation Tactile Decrease] : light touch was intact [Abnormal Walk] : normal gait [Balance] : balance was intact

## 2021-05-19 LAB
CHOLEST SERPL-MCNC: 115 MG/DL
ESTIMATED AVERAGE GLUCOSE: 120 MG/DL
HBA1C MFR BLD HPLC: 5.8 %
HDLC SERPL-MCNC: 40 MG/DL
LDLC SERPL CALC-MCNC: 56 MG/DL
NONHDLC SERPL-MCNC: 75 MG/DL
TRIGL SERPL-MCNC: 94 MG/DL

## 2021-05-19 NOTE — ASSESSMENT
[FreeTextEntry1] : 51 year old male with PMHx small CVA in past and known posterior fossa arachnoid cyst (follows with Dr. Ferreira per report 2020 imaging stable) presented to ER with sudden onset R hemiparesis and ataxia. Stroke code was activated, CTA head and neck negative for LVO, MRI showed left punctate corona radiata infarct, started on ASA and Plavix. Here for neurosurgical evaluation of posterior fossa  arachnoid cyst. \par Patient c/o mild intermittent right hand numbness otherwise no neurological complaints or symptoms. Neurologically intact, MRI reviewed with patient, stable cyst, no neurosurgical intervention. Patient will see neurology for further stroke work up and management. \par \par

## 2021-05-20 ENCOUNTER — APPOINTMENT (OUTPATIENT)
Dept: GASTROENTEROLOGY | Facility: CLINIC | Age: 51
End: 2021-05-20
Payer: COMMERCIAL

## 2021-05-20 VITALS — BODY MASS INDEX: 27.59 KG/M2 | HEIGHT: 74 IN | WEIGHT: 215 LBS

## 2021-05-20 PROCEDURE — 99214 OFFICE O/P EST MOD 30 MIN: CPT | Mod: 95

## 2021-05-20 RX ORDER — GLYCOPYRROLATE AND FORMOTEROL FUMARATE 9; 4.8 UG/1; UG/1
9-4.8 AEROSOL, METERED RESPIRATORY (INHALATION)
Qty: 3 | Refills: 1 | Status: DISCONTINUED | COMMUNITY
Start: 2020-03-12 | End: 2021-05-20

## 2021-05-20 RX ORDER — FAMOTIDINE 40 MG/1
40 TABLET, FILM COATED ORAL
Qty: 90 | Refills: 1 | Status: DISCONTINUED | COMMUNITY
Start: 2020-03-12 | End: 2021-05-20

## 2021-05-20 RX ORDER — CICLESONIDE 160 UG/1
160 AEROSOL, METERED RESPIRATORY (INHALATION) TWICE DAILY
Qty: 3 | Refills: 2 | Status: DISCONTINUED | COMMUNITY
Start: 2020-03-12 | End: 2021-05-20

## 2021-05-20 RX ORDER — ASPIRIN 81 MG
81 TABLET, DELAYED RELEASE (ENTERIC COATED) ORAL
Refills: 0 | Status: DISCONTINUED | COMMUNITY
End: 2021-05-20

## 2021-05-20 RX ORDER — AZELASTINE HYDROCHLORIDE 137 UG/1
0.1 SPRAY, METERED NASAL
Qty: 30 | Refills: 0 | Status: DISCONTINUED | COMMUNITY
Start: 2021-02-11 | End: 2021-05-20

## 2021-05-20 RX ORDER — AMOXICILLIN AND CLAVULANATE POTASSIUM 875; 125 MG/1; MG/1
875-125 TABLET, COATED ORAL
Qty: 20 | Refills: 0 | Status: DISCONTINUED | COMMUNITY
Start: 2020-03-17 | End: 2021-05-20

## 2021-05-20 RX ORDER — CLOPIDOGREL BISULFATE 75 MG/1
75 TABLET, FILM COATED ORAL
Qty: 30 | Refills: 0 | Status: DISCONTINUED | COMMUNITY
Start: 2021-05-03 | End: 2021-05-20

## 2021-05-20 RX ORDER — ROSUVASTATIN CALCIUM 10 MG/1
10 TABLET, FILM COATED ORAL
Qty: 30 | Refills: 0 | Status: DISCONTINUED | COMMUNITY
Start: 2021-05-11 | End: 2021-05-20

## 2021-05-20 RX ORDER — OLOPATADINE HYDROCHLORIDE 665 UG/1
0.6 SPRAY, METERED NASAL
Qty: 3 | Refills: 1 | Status: DISCONTINUED | COMMUNITY
Start: 2020-03-12 | End: 2021-05-20

## 2021-05-20 RX ORDER — ALBUTEROL SULFATE 90 UG/1
108 (90 BASE) AEROSOL, METERED RESPIRATORY (INHALATION) EVERY 6 HOURS
Qty: 1 | Refills: 2 | Status: DISCONTINUED | COMMUNITY
Start: 2020-03-12 | End: 2021-05-20

## 2021-05-20 NOTE — PHYSICAL EXAM
[General Appearance - Alert] : alert [General Appearance - In No Acute Distress] : in no acute distress [Outer Ear] : the ears and nose were normal in appearance [Oropharynx] : the oropharynx was normal [Neck Appearance] : the appearance of the neck was normal [Neck Cervical Mass (___cm)] : no neck mass was observed [Jugular Venous Distention Increased] : there was no jugular-venous distention [Thyroid Diffuse Enlargement] : the thyroid was not enlarged [Thyroid Nodule] : there were no palpable thyroid nodules [Auscultation Breath Sounds / Voice Sounds] : lungs were clear to auscultation bilaterally [Heart Rate And Rhythm] : heart rate was normal and rhythm regular [Heart Sounds] : normal S1 and S2 [Heart Sounds Gallop] : no gallops [Murmurs] : no murmurs [Heart Sounds Pericardial Friction Rub] : no pericardial rub [Edema] : there was no peripheral edema [Bowel Sounds] : normal bowel sounds [Abdomen Soft] : soft [Abdomen Tenderness] : non-tender [Abdomen Mass (___ Cm)] : no abdominal mass palpated [Cervical Lymph Nodes Enlarged Posterior Bilaterally] : posterior cervical [Cervical Lymph Nodes Enlarged Anterior Bilaterally] : anterior cervical [No CVA Tenderness] : no ~M costovertebral angle tenderness [No Spinal Tenderness] : no spinal tenderness [Abnormal Walk] : normal gait [Nail Clubbing] : no clubbing  or cyanosis of the fingernails [Musculoskeletal - Swelling] : no joint swelling seen [Motor Tone] : muscle strength and tone were normal [Skin Color & Pigmentation] : normal skin color and pigmentation [Skin Turgor] : normal skin turgor [] : no rash [No Focal Deficits] : no focal deficits [Oriented To Time, Place, And Person] : oriented to person, place, and time [Impaired Insight] : insight and judgment were intact [Affect] : the affect was normal

## 2021-05-20 NOTE — HISTORY OF PRESENT ILLNESS
[Home] : at home, [unfilled] , at the time of the visit. [Medical Office: ([facility])___] : at the medical office located in  [Verbal consent obtained from patient] : the patient, [unfilled] [de-identified] : Since last visit pt had two cva's and is on plavix... undergoing w/u.\par \par The patient still moves bowels daily. Denies any abdominal pain, constipation, diarrhea, bright red blood per rectum. Weight is up 10 lbs.\par \par No more heartburn, odynophagia, dysphagia or early satiety.\par \par No history of anemia or abnormal liver enzymes.\par \par Due for screening colonoscopy given personal history of polyps and family history of colon cancer.

## 2021-05-20 NOTE — ASSESSMENT
[FreeTextEntry1] : May impression is that of a male with a history of adenomatous polyps and a family history of colon cancer due for a surveillance colonoscopy.\par \par I have asked the patient to schedule a colonoscopy when cleared to hold plavix. I have reviewed the risks benefits and alternatives and provided the patient literature to read.  I have emphasized the need to have a good clean out including adequate fluid intake and avoiding seeds for one week prior to the procedure.

## 2021-06-13 ENCOUNTER — NON-APPOINTMENT (OUTPATIENT)
Age: 51
End: 2021-06-13

## 2021-06-13 NOTE — DISCUSSION/SUMMARY
[FreeTextEntry1] : Summary from NP Berenice Francois's note dated 5/17/2021-revised by me\par \par On 5/1/2021 he presented with a mild either right sensory-motor syndrome versus ataxic hemiparesis, due to a L corona radiata infarct with mechanism likely small-vessel disease. \par \par Overall patient is neurologically stable. Continue ASA/Plavix as per [facility] and then Plavix thereafter.  ILR  interrogation reportedly negative. Referral placed for PT. Home sleep study  to assess for sleep apnea due to hx of snoring. Hx of headaches - advised to keep a headache journal. He has an appointment with hematology this week for hypercoag workup\par \par 7/14/2021.\par \par Hematology for hypercoagulability profile?\par \par \par \par Consult note sent to:\par Dr. Jose Baig \par Dr. Benjamin Chandler

## 2021-06-13 NOTE — HISTORY OF PRESENT ILLNESS
[FreeTextEntry1] : 51-year-old right-handed gentleman\par \par Summary from NP Berenice Francois's note dated 5/17/2021-revised by me\par \par He has a history of ?TIAs s/p recent Loop recorder placement, retrocerebellar arachnoid cyst being followed by Dr. Chandler with annual MRIs (imaging in Oct 2020 was stable) who presents today for post hospitalization follow up after a L punctate corona radiata infarct on 5/1/2021\par \par HPI: He presented on 5/1/2021 for RUE paresthesias and RLE weakness described as "heaviness". NIHSS 1 for RLE drift. tPA was not given as  symptoms were rapidly improving and he had no disabling deficits. CTH demonstrated retrocerebellar arachnoid cyst with mass effect, tonsillar herniation and partial effacement of 4th ventricle. CTA H/N negative for LVO,  MRI showed L punctate corona radiata infarct. Was initially placed on DAPT per [facility] protocol. Echo WNL and LE dopplers no DVT \par \par He was evaluated by neuro surg today for arachnoid cyst with 1cm Tonsillar herniation, who report no intervention. He has been on ASA for about 2 years after presumed TIA and is s/p ILR. He was instructed to stop ASA after [facility] and continue with Plavix for presumed ASA failure. He has an appointment with hematology this week for hypercoag workup. He overall feels well but is fatigued with some R arm heaviness. He is not yet enrolled in PT.\par \par 7/14/2021.\par \par Did he see hematology for hypercoagulability profile?

## 2021-06-13 NOTE — CONSULT LETTER
[Dear  ___] : Dear ~MARY JO, [Consult Letter:] : I had the pleasure of evaluating your patient, [unfilled]. [Please see my note below.] : Please see my note below. [Consult Closing:] : Thank you very much for allowing me to participate in the care of this patient.  If you have any questions, please do not hesitate to contact me. [Sincerely,] : Sincerely, [FreeTextEntry2] : Gordon Chandler MD [FreeTextEntry3] : Richard B. Libman, MD, FRCPC\par , Neurology \par Co-Director, Stroke Center\par Professor of Neurology\par [employer] [facility]\par  [DrOdalys  ___] : Dr. RENDON

## 2021-06-22 ENCOUNTER — OUTPATIENT (OUTPATIENT)
Dept: OUTPATIENT SERVICES | Facility: HOSPITAL | Age: 51
LOS: 1 days | End: 2021-06-22
Payer: COMMERCIAL

## 2021-06-22 ENCOUNTER — APPOINTMENT (OUTPATIENT)
Dept: ULTRASOUND IMAGING | Facility: IMAGING CENTER | Age: 51
End: 2021-06-22

## 2021-06-22 ENCOUNTER — APPOINTMENT (OUTPATIENT)
Dept: UROLOGY | Facility: CLINIC | Age: 51
End: 2021-06-22
Payer: COMMERCIAL

## 2021-06-22 VITALS
DIASTOLIC BLOOD PRESSURE: 68 MMHG | BODY MASS INDEX: 27.59 KG/M2 | HEIGHT: 74 IN | WEIGHT: 215 LBS | RESPIRATION RATE: 17 BRPM | TEMPERATURE: 98 F | SYSTOLIC BLOOD PRESSURE: 108 MMHG | HEART RATE: 68 BPM

## 2021-06-22 DIAGNOSIS — M54.9 DORSALGIA, UNSPECIFIED: ICD-10-CM

## 2021-06-22 DIAGNOSIS — Z87.19 PERSONAL HISTORY OF OTHER DISEASES OF THE DIGESTIVE SYSTEM: Chronic | ICD-10-CM

## 2021-06-22 PROCEDURE — 99203 OFFICE O/P NEW LOW 30 MIN: CPT

## 2021-06-22 PROCEDURE — 76775 US EXAM ABDO BACK WALL LIM: CPT

## 2021-06-22 PROCEDURE — 99072 ADDL SUPL MATRL&STAF TM PHE: CPT

## 2021-06-22 NOTE — REVIEW OF SYSTEMS
[Feeling Tired] : feeling tired [Dry Eyes] : dryness of the eyes [Wheezing] : wheezing [see HPI] : see HPI [Loss of interest] : loss of interest in sexual activity [History of kidney stones] : history of kidney stones [Joint Pain] : joint pain [Dizziness] : dizziness [Feelings Of Weakness] : feelings of weakness [Negative] : Heme/Lymph

## 2021-06-22 NOTE — HISTORY OF PRESENT ILLNESS
[FreeTextEntry1] : 52 yo M here to establish care. Had recent stroke and interested in checking through all health issues. PSA per patient normal earlier this year, AURE normal today. Has some right upper back pain that resolves with heat, unlikely renal in origin. Also with low libido and per patient Testosterone was in low normal range.\par \par  [Urinary Incontinence] : no urinary incontinence [Urinary Retention] : no urinary retention [Urinary Urgency] : no urinary urgency [Urinary Frequency] : no urinary frequency [Nocturia] : no nocturia

## 2021-06-22 NOTE — PHYSICAL EXAM
[General Appearance - Well Developed] : well developed [Heart Rate And Rhythm] : Heart rate and rhythm were normal [Respiration, Rhythm And Depth] : normal respiratory rhythm and effort [Abdomen Hernia] : no hernia was discovered [No Prostate Nodules] : no prostate nodules [Prostate Size ___ gm] : prostate size [unfilled] gm [Normal Station and Gait] : the gait and station were normal for the patient's age [Skin Turgor] : supple

## 2021-06-22 NOTE — ASSESSMENT
[FreeTextEntry1] : 52 yo M with recent stroke, here for general urologic care\par \par - send PSA from [facility] to confirm normal\par - AURE normal, repeat both in 2 years\par - US ordered patient wishes to check renal anatomy

## 2021-06-25 ENCOUNTER — NON-APPOINTMENT (OUTPATIENT)
Age: 51
End: 2021-06-25

## 2021-06-25 ENCOUNTER — APPOINTMENT (OUTPATIENT)
Dept: PULMONOLOGY | Facility: CLINIC | Age: 51
End: 2021-06-25
Payer: COMMERCIAL

## 2021-06-25 VITALS
RESPIRATION RATE: 16 BRPM | TEMPERATURE: 96.4 F | BODY MASS INDEX: 28.23 KG/M2 | HEART RATE: 70 BPM | OXYGEN SATURATION: 97 % | SYSTOLIC BLOOD PRESSURE: 110 MMHG | HEIGHT: 73 IN | DIASTOLIC BLOOD PRESSURE: 70 MMHG | WEIGHT: 213 LBS

## 2021-06-25 PROCEDURE — 94010 BREATHING CAPACITY TEST: CPT

## 2021-06-25 PROCEDURE — 95012 NITRIC OXIDE EXP GAS DETER: CPT

## 2021-06-25 PROCEDURE — 99214 OFFICE O/P EST MOD 30 MIN: CPT | Mod: 25

## 2021-06-25 PROCEDURE — 99072 ADDL SUPL MATRL&STAF TM PHE: CPT

## 2021-06-25 PROCEDURE — 94618 PULMONARY STRESS TESTING: CPT

## 2021-06-25 PROCEDURE — 76775 US EXAM ABDO BACK WALL LIM: CPT | Mod: 26

## 2021-06-25 NOTE — PHYSICAL EXAM
[No Acute Distress] : no acute distress [Normal Oropharynx] : normal oropharynx [II] : Mallampati Class: II [Normal Appearance] : normal appearance [No Neck Mass] : no neck mass [Normal Rate/Rhythm] : normal rate/rhythm [Normal S1, S2] : normal s1, s2 [No Murmurs] : no murmurs [No Resp Distress] : no resp distress [Clear to Auscultation Bilaterally] : clear to auscultation bilaterally [No Abnormalities] : no abnormalities [Benign] : benign [No Clubbing] : no clubbing [Normal Gait] : normal gait [No Cyanosis] : no cyanosis [No Edema] : no edema [FROM] : FROM [Normal Color/ Pigmentation] : normal color/ pigmentation [No Focal Deficits] : no focal deficits [Oriented x3] : oriented x3 [Normal Affect] : normal affect [TextBox_11] : little bit of wax in ears  [TextBox_68] : I:E ratio 1:3; clear

## 2021-06-25 NOTE — PROCEDURE
[FreeTextEntry1] : may 3rd ECHO (5.3.2021) conclusion:1. normal mitral valve, minimal mitral regurgitation. 2. normal trileaflet arotic valve. no aortic valve regurgitation seen. 3. normal left ventricular systolic function. no segmental wall motion abnormalities. 4. normal diastolic function. 5. right ventricular enlargement with normal right ventricular systolic function. 6. color Doppler demonstrates no evidence of patent foramen ovale. 7. normal pericardium with trace pericardial effusion. \par \par \par CT angio of brain left retro cerebral arachnoid cyst, associated with cerebellar tonsillar \par \par PFT reveals normal flows, with an FEV1 of   3.06L, which is  87% of predicted, with normal flow volume loop \par \par  FENO was 104 ; normal value being less than 25\par Fractional exhaled nitric oxide (FENO) is regarded as a simple, noninvasive method for assessing eosinophilic airway inflammation. Produced by a variety of cells within the lung, nitric oxide (NO) concentrations are generally low in healthy individuals. However, high concentrations of NO appear to be involved in nonspecific host defense mechanisms and chronic inflammatory diseases such as asthma. The American Thoracic Society (ATS) therefore has recommended using FENO to aid in the diagnosis and monitoring of eosinophilic airway inflammation and asthma, and for identifying steroid responsive individuals whose chronic respiratory symptoms may be airway inflammation. \par \par 6 minute walk test reveals a low saturation of  96% with no evidence of dyspnea or fatigue; walked  391.2 meters

## 2021-06-25 NOTE — ASSESSMENT
[FreeTextEntry1] : Mr. MCKEON is a 51 year old male with a history of  herniated disk, CVA, loop recorder for ?Afib, colonic polyp, elevated cholesterol, prior GERD, s/p hiatal hernia surgery, childhood asthma, adult asthma, CVA 5/1/2021 - PFO Dx - awaiting Rx who comes into the office today for pulmonary evaluation for SOB CVA 5/1/2021 - PFO Dx - awaiting Rx\par \par The patient's shortness of breath is multifactorial due to:\par -pulmonary disease \par      - Asthma \par      - ?HEATHER\par -poor breathing mechanics \par -little bit overweight/out of shape\par -?cardiac disease - Dr. Baig ?Afib \par - GERD / LPR\par \par Problem 1: Asthma (mild)\par -Add Bevespi 2 inhalations BID\par -Add Alvesco (160) 2 inhalations BID\par -Add Ventolin rescue inhaler 2 inhalations before exercise, Q6H (PRN)\par -Asthma is believed to be caused by inherited (genetic) and environmental factor, but its exact cause is unknown. Asthma may be triggered by allergens, lung infections, or irritants in the air. Asthma triggers are different for each person\par \par Problem 1A: Eosinophilic Asthma \par - candidate for Fasenra \par -The safety and efficacy of Nucala was established in three double-blind, randomized, placebo-controlled trials in patients with severe asthma. Compared to a placebo, patients with severe asthma receiving Nucala had fewer exacerbation requiring hospitalization and/or emergency department visits, and a longer time to first exacerbation.  In addition, patients with severe asthma receiving Nucala or Fasenra experienced greater reductions in their daily maintenance oral corticosteroid dose, while maintaining asthma control compared with patients receiving placebo. Treatment with Nucala did not result in a significant improvement in lung function, as measured by the volume of air exhaled by patients in one second. The most common side effects include: headache, injection site reactions, back pain, weakness, and fatigue; hypersensitivity reactions can occur within hours or days including swelling of the face, mouth, and tongue, fainting, dizziness, hives, breathing problems, and rash; herpes zoster infections have occurred. The drug is a monoclonal antibody that inhibits interleukin-5 which helps regular eosinophils, a type of white blood cell that contributes to asthma. The over-production of eosinophils can cause inflammation in the lungs, increasing the frequency of asthma attacks. Patients must also take other medications, including high dose inhaled corticosteroids and at least one additional asthma drug \par \par Problem 2: Allergy / Sinus \par -Add Olopatadine 0.6% at 1 sniff/nostril BID\par -s/p blood work to include: IgE level, eosinophil level(+), vitamin D level(low), food IgE level(-), and asthma profile(+)\par Environmental measures for allergies were encouraged including mattress and pillow cover, air purifier, and environmental controls.\par \par Problem 2A: Low vitamin D \par - on rx \par Low vitamin D levels have been associated with asthma exacerbations and increased allergic symptoms. The goal based on recent information is maintaining levels between 50-70 and low normal is 30. Recommended 50,000 units every two weeks to once a month depending on the level. \par \par \par \par Problem 3: GERD / LPR -  s/p Nissen fundoplication \par -Add Pepcid 40 mg QHS\par -Rule of 2s: avoid eating too much, eating too fast, eating too late, eating too spicy, eating too lousy, eating two hours before bed.\par -Things to avoid including overeating, spicy foods, tight clothing, eating within three hours of bed, this list is not all inclusive. \par -For treatment of reflux, possible options discussed including diet control, H2 blockers, PPIs, as well as coating motility agents discussed as treatment options. Timing of meals and proximity of last meal to sleep were discussed. If symptoms persist, a formal gastrointestinal evaluation is needed.\par \par \par Problem 4: ?HEATHER(risk: neck size, chronic reflux, poor sleep)\par - Get Home Sleep Study to rule out sleep apnea\par - Suggested Oral Appliance for sleep apnea\par Sleep apnea is associated with adverse clinical consequences which an affect most organ systems. Cardiovascular disease risk includes arrhythmias, atrial fibrillation, hypertension, coronary artery disease, and stroke. Metabolic disorders include diabetes type 2, non-alcoholic fatty liver disease. Mood disorder especially depression; and cognitive decline especially in the elderly. Associations with chronic reflux/Byrne’s esophagus some but not all inclusive. \par -Reasons include arousal consistent with hypopnea; respiratory events most prominent in REM sleep or supine position; therefore sleep staging and body position are important for accurate diagnosis and estimation of AHI.\par \par \par \par Problem 5 : Poor Mechanics of Breathing\par - Proper breathing techniques were reviewed with an emphasis of exhalation. Patient instructed to breath in for 1 second and out for four seconds. Patient was encouraged to not talk while walking. \par \par Problem 6 : a little Overweight\par -Weight loss, exercise, and diet control were discussed and are highly encouraged. Treatment options were given such as, aqua therapy, and contacting a nutritionist. Recommended to use the elliptical, stationary bike, less use of treadmill. Mindful eating was explained to the patient Obesity is associated with worsening asthma, shortness of breath, and potential for cardiac disease, diabetes, and other underlying medical conditions. \par \par Problem 7 : Cardiac (PFO) \par -recommended to follow up with a cardiologist Tyler/ Divya - awaiting PFO rx \par \par Problem 8 : health maintenance\par - covid-19 vaccine \par - Recommended Throat Coat Tea (Slippery [employer]m) \par -s/p influenza vaccine\par -recommended strep pneumonia vaccines after age 65: Prevnar-13 vaccine, followed by Pneumo vaccine 23 one year following\par -recommended early intervention for URIs\par -recommended regular osteoporosis evaluations\par -recommended early dermatological evaluations\par -recommended after the age of 50 to the age of 70, colonoscopy every 5 years \par \par  Follow up in 6-8 weeks\par -he  is recommended to call with any changes, questions, or concerns.

## 2021-06-25 NOTE — HISTORY OF PRESENT ILLNESS
[TextBox_4] : Mr. MCKEON is a 50 year old male hx of herniated disk, CVA, loop recorder for ?Afib, colonic polyp, elevated cholesterol, prior GERD, s/p hiatal hernia surgery, childhood asthma,  presenting to the office today for follow up pulmonary evaluation. His chief complaint is\par - his allergy shots have been helping him \par - he notes when he had the Arachnoid cyst he went to  had it checked out and there was nothing, but he was then told he had a stroke. He then was sent to a get a loop recorder in and everything was fine. But then May 1st he had another stroke. He got into an ambulance and was found he had a blood clot in the back of his brain. \par - he saw a stroke specialist Dr. Camacho, was found he had a PFO. \par - he had a whole workup down for a hypercoagulable state. \par - He feels residuals after the last stroke. He feels like hes dragging, hes fatigued, his muscles hurt. \par - He's wondering if there's something else he can do until his PFO is treated. \par - he has had headaches for 2 1/2 years. He was getting pain shots. It helps somewhat. \par - no difficulty swallowing, certainly not post-stroke. \par - he has acid reflux \par - he gets itchy eyes / ears; he has seasonal allergies\par - his optometrist told him he has dry eye. \par - he notes he lost a little bit of weight, down 5 lbs \par - no coughing / wheezing\par - when he wakes up in the morning he feels tired. \par - if he ran up a flight of stairs he would be SOB\par - He  denies any visual issues, headaches, nausea, vomiting, fever, chills, sweats, chest pains, chest pressure, diarrhea, constipation, dysphagia, myalgia, dizziness, leg swelling, leg pain, itchy eyes, itchy ears, heartburn, reflux, or sour taste in the mouth.

## 2021-06-25 NOTE — ADDENDUM
[FreeTextEntry1] : Documented by Cait Jensen acting as a scribe for Dr. Demario Tai on 06/25/2021 \par \par All medical record entries made by the Scribe were at my, Dr. Demario Tai's, direction and personally dictated by me on 06/25/2021 . I have reviewed the chart and agree that the record accurately reflects my personal performance of the history, physical exam, assessment and plan. I have also personally directed, reviewed, and agree with the discharge instructions. \par

## 2021-07-13 ENCOUNTER — APPOINTMENT (OUTPATIENT)
Dept: NEUROLOGY | Facility: CLINIC | Age: 51
End: 2021-07-13

## 2021-08-04 ENCOUNTER — NON-APPOINTMENT (OUTPATIENT)
Age: 51
End: 2021-08-04

## 2021-08-06 ENCOUNTER — NON-APPOINTMENT (OUTPATIENT)
Age: 51
End: 2021-08-06

## 2021-08-10 ENCOUNTER — TRANSCRIPTION ENCOUNTER (OUTPATIENT)
Age: 51
End: 2021-08-10

## 2021-08-10 ENCOUNTER — APPOINTMENT (OUTPATIENT)
Dept: ORTHOPEDIC SURGERY | Facility: CLINIC | Age: 51
End: 2021-08-10
Payer: COMMERCIAL

## 2021-08-10 ENCOUNTER — NON-APPOINTMENT (OUTPATIENT)
Age: 51
End: 2021-08-10

## 2021-08-10 VITALS
HEART RATE: 58 BPM | HEIGHT: 73 IN | BODY MASS INDEX: 28.23 KG/M2 | WEIGHT: 213 LBS | SYSTOLIC BLOOD PRESSURE: 99 MMHG | DIASTOLIC BLOOD PRESSURE: 65 MMHG

## 2021-08-10 PROCEDURE — 99203 OFFICE O/P NEW LOW 30 MIN: CPT

## 2021-08-10 PROCEDURE — 99213 OFFICE O/P EST LOW 20 MIN: CPT

## 2021-08-10 PROCEDURE — 73564 X-RAY EXAM KNEE 4 OR MORE: CPT | Mod: RT

## 2021-09-29 ENCOUNTER — APPOINTMENT (OUTPATIENT)
Dept: PULMONOLOGY | Facility: CLINIC | Age: 51
End: 2021-09-29
Payer: COMMERCIAL

## 2021-09-29 VITALS
HEIGHT: 73 IN | WEIGHT: 215 LBS | SYSTOLIC BLOOD PRESSURE: 101 MMHG | RESPIRATION RATE: 16 BRPM | BODY MASS INDEX: 28.49 KG/M2 | OXYGEN SATURATION: 98 % | DIASTOLIC BLOOD PRESSURE: 70 MMHG | TEMPERATURE: 97.2 F | HEART RATE: 70 BPM

## 2021-09-29 PROCEDURE — 99214 OFFICE O/P EST MOD 30 MIN: CPT

## 2021-09-29 NOTE — ASSESSMENT
[FreeTextEntry1] : Mr. MCKEON is a 51 year old male with a history of  herniated disk, CVA, loop recorder for ?Afib, colonic polyp, elevated cholesterol, prior GERD, s/p hiatal hernia surgery, childhood asthma, adult asthma, CVA 5/1/2021 - PFO Dx - awaiting Rx who comes into the office today for pulmonary evaluation for SOB CVA 5/1/2021 - PFO Dx - s/p closure - complicated by Afib - untreated [facility]AS\par \par The patient's shortness of breath is multifactorial due to:\par -pulmonary disease \par      - Asthma \par      - ?HEATHER\par -poor breathing mechanics \par -little bit overweight/out of shape\par -?cardiac disease - Dr. Baig ?Afib \par - GERD / LPR\par \par Problem 1: Asthma (mild)\par -continue Bevespi 2 inhalations BID\par -continue Alvesco (160) 2 inhalations BID\par -continue Ventolin rescue inhaler 2 inhalations before exercise, Q6H (PRN)\par -Asthma is believed to be caused by inherited (genetic) and environmental factor, but its exact cause is unknown. Asthma may be triggered by allergens, lung infections, or irritants in the air. Asthma triggers are different for each person\par \par Problem 1A: Eosinophilic Asthma \par - candidate for Fasenra \par -The safety and efficacy of Nucala was established in three double-blind, randomized, placebo-controlled trials in patients with severe asthma. Compared to a placebo, patients with severe asthma receiving Nucala had fewer exacerbation requiring hospitalization and/or emergency department visits, and a longer time to first exacerbation.  In addition, patients with severe asthma receiving Nucala or Fasenra experienced greater reductions in their daily maintenance oral corticosteroid dose, while maintaining asthma control compared with patients receiving placebo. Treatment with Nucala did not result in a significant improvement in lung function, as measured by the volume of air exhaled by patients in one second. The most common side effects include: headache, injection site reactions, back pain, weakness, and fatigue; hypersensitivity reactions can occur within hours or days including swelling of the face, mouth, and tongue, fainting, dizziness, hives, breathing problems, and rash; herpes zoster infections have occurred. The drug is a monoclonal antibody that inhibits interleukin-5 which helps regular eosinophils, a type of white blood cell that contributes to asthma. The over-production of eosinophils can cause inflammation in the lungs, increasing the frequency of asthma attacks. Patients must also take other medications, including high dose inhaled corticosteroids and at least one additional asthma drug \par \par Problem 2: Allergy / Sinus \par -continue Olopatadine 0.6% at 1 sniff/nostril BID\par -s/p blood work to include: IgE level, eosinophil level(+), vitamin D level(low), food IgE level(-), and asthma profile(+)\par Environmental measures for allergies were encouraged including mattress and pillow cover, air purifier, and environmental controls.\par \par Problem 2A: Low vitamin D \par - on rx \par Low vitamin D levels have been associated with asthma exacerbations and increased allergic symptoms. The goal based on recent information is maintaining levels between 50-70 and low normal is 30. Recommended 50,000 units every two weeks to once a month depending on the level. \par \par Problem 3: GERD / LPR -  s/p Nissen fundoplication \par -continue Pepcid 40 mg QHS\par -Rule of 2s: avoid eating too much, eating too fast, eating too late, eating too spicy, eating too lousy, eating two hours before bed.\par -Things to avoid including overeating, spicy foods, tight clothing, eating within three hours of bed, this list is not all inclusive. \par -For treatment of reflux, possible options discussed including diet control, H2 blockers, PPIs, as well as coating motility agents discussed as treatment options. Timing of meals and proximity of last meal to sleep were discussed. If symptoms persist, a formal gastrointestinal evaluation is needed.\par \par \par Problem 4: (+)HEATHER(risk: neck size, chronic reflux, poor sleep)\par - Suggested Oral Appliance for sleep apnea (Danoff - pending dental device)\par Sleep apnea is associated with adverse clinical consequences which an affect most organ systems. Cardiovascular disease risk includes arrhythmias, atrial fibrillation, hypertension, coronary artery disease, and stroke. Metabolic disorders include diabetes type 2, non-alcoholic fatty liver disease. Mood disorder especially depression; and cognitive decline especially in the elderly. Associations with chronic reflux/Byrne’s esophagus some but not all inclusive. \par -Reasons include arousal consistent with hypopnea; respiratory events most prominent in REM sleep or supine position; therefore sleep staging and body position are important for accurate diagnosis and estimation of AHI.\par \par Problem 5 : Poor Mechanics of Breathing\par - Proper breathing techniques were reviewed with an emphasis of exhalation. Patient instructed to breath in for 1 second and out for four seconds. Patient was encouraged to not talk while walking. \par \par Problem 6 : Overweight\par -Recommend "Muniq" OTC Supplement \par -Weight loss, exercise, and diet control were discussed and are highly encouraged. Treatment options were given such as, aqua therapy, and contacting a nutritionist. Recommended to use the elliptical, stationary bike, less use of treadmill. Mindful eating was explained to the patient Obesity is associated with worsening asthma, shortness of breath, and potential for cardiac disease, diabetes, and other underlying medical conditions. \par \par Problem 7 : Cardiac (PFO) \par -recommended to follow up with a cardiologist Tyler/ Divya - s/p PFO closure - complicated by AFib and now on Eliquis\par \par Problem 8 : health maintenance\par - covid-19 vaccine \par - Recommended Throat Coat Tea (Slippery Elm) \par -s/p influenza vaccine\par -recommended strep pneumonia vaccines after age 65: Prevnar-13 vaccine, followed by Pneumo vaccine 23 one year following\par -recommended early intervention for URIs\par -recommended regular osteoporosis evaluations\par -recommended early dermatological evaluations\par -recommended after the age of 50 to the age of 70, colonoscopy every 5 years \par \par  Follow up in 6-8 weeks\par -he  is recommended to call with any changes, questions, or concerns.

## 2021-09-29 NOTE — ADDENDUM
[FreeTextEntry1] : Documented by Pedro Kim acting as a scribe for Dr. Demario Tai on (09/29/2021).\par \par All medical record entries made by the Scribe were at my, Dr. Demario Tai's, direction and personally dictated by me on (09/29/2021). I have reviewed the chart and agree that the record accurately reflects my personal performance of the history, physical exam, assessment and plan. I have also personally directed, reviewed, and agree with the discharge instructions.\par

## 2021-09-29 NOTE — PHYSICAL EXAM
[No Acute Distress] : no acute distress [Normal Oropharynx] : normal oropharynx [II] : Mallampati Class: II [Normal Appearance] : normal appearance [No Neck Mass] : no neck mass [Normal Rate/Rhythm] : normal rate/rhythm [Normal S1, S2] : normal s1, s2 [No Murmurs] : no murmurs [No Resp Distress] : no resp distress [Clear to Auscultation Bilaterally] : clear to auscultation bilaterally [No Abnormalities] : no abnormalities [Benign] : benign [Normal Gait] : normal gait [No Clubbing] : no clubbing [No Cyanosis] : no cyanosis [No Edema] : no edema [FROM] : FROM [Normal Color/ Pigmentation] : normal color/ pigmentation [No Focal Deficits] : no focal deficits [Oriented x3] : oriented x3 [Normal Affect] : normal affect [TextBox_11] : s/p UVPP [TextBox_68] : I:E ratio 1:3; clear

## 2021-09-29 NOTE — HISTORY OF PRESENT ILLNESS
[TextBox_4] : Mr. MCKEON is a 50 year old male hx of herniated disk, CVA, loop recorder for ?Afib, colonic polyp, elevated cholesterol, prior GERD, s/p hiatal hernia surgery, childhood asthma,  presenting to the office today for follow up pulmonary evaluation. His chief complaint is\par -he notes the PFO was closed at [facility] on 7/7/2021\par -he notes it was complicated by AFib and was given Metoprolol and is now off it\par -he notes he is now on Eliquis\par -he notes sometimes feeling like his eyes are tired when he is typing and he feels dizzy\par -he notes this happens more on the weekday when he is working on the computer\par -he notes feeling like his eyes are straining\par -he notes gaining a few pounds recently\par -he notes feeling nervous to run around due to the stress\par -he notes a tear in his meniscus\par -he notes his umbilical hernia hurts more now that he has gained a few lbs\par -he notes he has not had surgery on the hernia\par -he notes being concerned about being on blood thinner\par -he notes he got fitted for a dental device and is getting it tomorrow\par -he notes wearing glasses and has an upcoming visit with his ophthalmologist \par \par patient denies any headaches, nausea, vomiting, fever, chills, sweats, chest pain, chest pressure, palpitations, coughing, wheezing, fatigue, diarrhea, constipation, dysphagia, myalgias, leg swelling, leg pain, itchy eyes, itchy ears, heartburn, reflux or sour taste in the mouth

## 2021-12-28 DIAGNOSIS — Z01.812 ENCOUNTER FOR PREPROCEDURAL LABORATORY EXAMINATION: ICD-10-CM

## 2022-01-10 ENCOUNTER — APPOINTMENT (OUTPATIENT)
Dept: PULMONOLOGY | Facility: CLINIC | Age: 52
End: 2022-01-10
Payer: COMMERCIAL

## 2022-01-10 ENCOUNTER — NON-APPOINTMENT (OUTPATIENT)
Age: 52
End: 2022-01-10

## 2022-01-10 VITALS
DIASTOLIC BLOOD PRESSURE: 80 MMHG | SYSTOLIC BLOOD PRESSURE: 110 MMHG | WEIGHT: 220 LBS | TEMPERATURE: 96.5 F | BODY MASS INDEX: 29.16 KG/M2 | RESPIRATION RATE: 17 BRPM | HEIGHT: 73 IN | HEART RATE: 69 BPM | OXYGEN SATURATION: 97 %

## 2022-01-10 PROCEDURE — 94010 BREATHING CAPACITY TEST: CPT

## 2022-01-10 PROCEDURE — 95012 NITRIC OXIDE EXP GAS DETER: CPT

## 2022-01-10 PROCEDURE — 99214 OFFICE O/P EST MOD 30 MIN: CPT | Mod: 25

## 2022-01-10 NOTE — ADDENDUM
[FreeTextEntry1] : Documented by Cait Jensen acting as a scribe for Dr. Demario Tai on (01/10/2022).\par \par All medical record entries made by the Scribe were at my, Dr. Demario Tai's, direction and personally dictated by me on (01/10/2022). I have reviewed the chart and agree that the record accurately reflects my personal performance of the history, physical exam, assessment and plan. I have also personally directed, reviewed, and agree with the discharge instructions.\par

## 2022-01-10 NOTE — ASSESSMENT
[FreeTextEntry1] : Mr. MCKEON is a 51 year old male with a history of  herniated disk, CVA, loop recorder for ?Afib, colonic polyp, elevated cholesterol, prior GERD, s/p hiatal hernia surgery, childhood asthma, adult asthma, CVA 5/1/2021 - PFO Dx - awaiting Rx who comes into the office today for pulmonary evaluation for SOB CVA 5/1/2021 - PFO Dx - s/p closure - complicated by Afib - treated OSAS (DD) - still fatigued \par \par The patient's shortness of breath is multifactorial due to:\par -pulmonary disease \par      - Asthma \par      - (+)HEATHER\par -poor breathing mechanics \par -little bit overweight/out of shape\par -?cardiac disease - Dr. Baig ?Afib \par - GERD / LPR\par \par Problem 1: Asthma (mild)\par -continue Bevespi 2 inhalations BID\par -continue Alvesco (160) 2 inhalations BID\par -continue Ventolin rescue inhaler 2 inhalations before exercise, Q6H (PRN)\par -Asthma is believed to be caused by inherited (genetic) and environmental factor, but its exact cause is unknown. Asthma may be triggered by allergens, lung infections, or irritants in the air. Asthma triggers are different for each person\par \par Problem 1A: Eosinophilic Asthma \par - candidate for Fasenra \par -The safety and efficacy of Nucala was established in three double-blind, randomized, placebo-controlled trials in patients with severe asthma. Compared to a placebo, patients with severe asthma receiving Nucala had fewer exacerbation requiring hospitalization and/or emergency department visits, and a longer time to first exacerbation.  In addition, patients with severe asthma receiving Nucala or Fasenra experienced greater reductions in their daily maintenance oral corticosteroid dose, while maintaining asthma control compared with patients receiving placebo. Treatment with Nucala did not result in a significant improvement in lung function, as measured by the volume of air exhaled by patients in one second. The most common side effects include: headache, injection site reactions, back pain, weakness, and fatigue; hypersensitivity reactions can occur within hours or days including swelling of the face, mouth, and tongue, fainting, dizziness, hives, breathing problems, and rash; herpes zoster infections have occurred. The drug is a monoclonal antibody that inhibits interleukin-5 which helps regular eosinophils, a type of white blood cell that contributes to asthma. The over-production of eosinophils can cause inflammation in the lungs, increasing the frequency of asthma attacks. Patients must also take other medications, including high dose inhaled corticosteroids and at least one additional asthma drug \par \par Problem 2: Allergy / Sinus \par -continue Olopatadine 0.6% at 1 sniff/nostril BID\par -s/p blood work to include: IgE level, eosinophil level(+), vitamin D level(low), food IgE level(-), and asthma profile(+)\par Environmental measures for allergies were encouraged including mattress and pillow cover, air purifier, and environmental controls.\par \par Problem 2A: Low vitamin D \par - on rx \par Low vitamin D levels have been associated with asthma exacerbations and increased allergic symptoms. The goal based on recent information is maintaining levels between 50-70 and low normal is 30. Recommended 50,000 units every two weeks to once a month depending on the level. \par \par Problem 3: GERD / LPR -  s/p Nissen fundoplication \par -continue Pepcid 40 mg QHS\par -Rule of 2s: avoid eating too much, eating too fast, eating too late, eating too spicy, eating too lousy, eating two hours before bed.\par -Things to avoid including overeating, spicy foods, tight clothing, eating within three hours of bed, this list is not all inclusive. \par -For treatment of reflux, possible options discussed including diet control, H2 blockers, PPIs, as well as coating motility agents discussed as treatment options. Timing of meals and proximity of last meal to sleep were discussed. If symptoms persist, a formal gastrointestinal evaluation is needed.\par \par \par Problem 4: (+)HEATHER(risk: neck size, chronic reflux, poor sleep)\par - Suggested Oral Appliance for sleep apnea (Danoff - dental device) \par - f/p SS \par Sleep apnea is associated with adverse clinical consequences which an affect most organ systems. Cardiovascular disease risk includes arrhythmias, atrial fibrillation, hypertension, coronary artery disease, and stroke. Metabolic disorders include diabetes type 2, non-alcoholic fatty liver disease. Mood disorder especially depression; and cognitive decline especially in the elderly. Associations with chronic reflux/Byrne’s esophagus some but not all inclusive. \par -Reasons include arousal consistent with hypopnea; respiratory events most prominent in REM sleep or supine position; therefore sleep staging and body position are important for accurate diagnosis and estimation of AHI.\par \par Problem 5 : Poor Mechanics of Breathing \par -Recommended Wim Hof and Buteyko breathing techniques \par - Proper breathing techniques were reviewed with an emphasis of exhalation. Patient instructed to breath in for 1 second and out for four seconds. Patient was encouraged to not talk while walking. \par \par Problem 6 : Overweight\par -Recommend "Muniq" OTC Supplement \par -Weight loss, exercise, and diet control were discussed and are highly encouraged. Treatment options were given such as, aqua therapy, and contacting a nutritionist. Recommended to use the elliptical, stationary bike, less use of treadmill. Mindful eating was explained to the patient Obesity is associated with worsening asthma, shortness of breath, and potential for cardiac disease, diabetes, and other underlying medical conditions. \par \par Problem 7 : Cardiac (PFO) \par -recommended to follow up with a cardiologist Tyler/ Divya - s/p PFO closure - complicated by AFib and now on Eliquis\par - recommended to follow up with vascular and neurologist \par \par Problem 8 : health maintenance\par - covid-19 vaccine x3 \par - Recommended Throat Coat Tea (Slippery Elm) \par -s/p influenza vaccine 2021 \par -recommended strep pneumonia vaccines after age 65: Prevnar-13 vaccine, followed by Pneumo vaccine 23 one year following\par -recommended early intervention for URIs\par -recommended regular osteoporosis evaluations\par -recommended early dermatological evaluations\par -recommended after the age of 50 to the age of 70, colonoscopy every 5 years \par \par  Follow up in 6-8 weeks\par -he  is recommended to call with any changes, questions, or concerns.

## 2022-01-10 NOTE — HISTORY OF PRESENT ILLNESS
[TextBox_4] : Mr. MCKEON is a 51 year old male hx of herniated disk, CVA, loop recorder for ?Afib, colonic polyp, elevated cholesterol, prior GERD, s/p hiatal hernia surgery, childhood asthma,  presenting to the office today for follow up pulmonary evaluation. His chief complaint is\par - he notes he is still feeling fatigued and stiffness every once in awhile \par - he gets some headaches on the right side but not as severe prior to PFO \par - he notes he drinks a lot of water\par - went to , got his mouth piece and he uses it each night. \par - using the mouth piece he feels well rested, and no snoring, but he still wakes up during the night\par - he did not has a repeat sleep study yet \par - no palpitations\par - he notes he saw his PCP and had some blood work last week and was told he should get on a multi-vitamin. \par - sinuses are good\par - uses his inhaler\par - he notes his sinuses on the right has been congested for awhile now \par - he notes he saw a cardiologist, neurologist, and now he will go see a cardiac surgeon \par - his blood work came back last week which showed it a little high on his eosinophils, high fatty liver, and 5.7 pre-DM \par - patient denies any headaches, nausea, vomiting, fever, chills, sweats, chest pain, chest pressure, palpitations, coughing, wheezing, fatigue, diarrhea, constipation, dysphagia, myalgias, dizziness, leg swelling, leg pain, itchy eyes, itchy ears, heartburn, reflux or sour taste in the mouth\par

## 2022-01-10 NOTE — PHYSICAL EXAM
[No Acute Distress] : no acute distress [Normal Oropharynx] : normal oropharynx [III] : Mallampati Class: III [Normal Appearance] : normal appearance [No Neck Mass] : no neck mass [Normal Rate/Rhythm] : normal rate/rhythm [Normal S1, S2] : normal s1, s2 [No Murmurs] : no murmurs [No Resp Distress] : no resp distress [Clear to Auscultation Bilaterally] : clear to auscultation bilaterally [No Abnormalities] : no abnormalities [Benign] : benign [Normal Gait] : normal gait [No Clubbing] : no clubbing [No Cyanosis] : no cyanosis [No Edema] : no edema [FROM] : FROM [Normal Color/ Pigmentation] : normal color/ pigmentation [No Focal Deficits] : no focal deficits [Oriented x3] : oriented x3 [Normal Affect] : normal affect [TextBox_11] : s/p UVPP [TextBox_68] : I:E ratio 1:3; clear

## 2022-01-10 NOTE — PROCEDURE
[FreeTextEntry1] : Feno was 18 ; a normal value being less than 25. Fractional exhaled nitric oxide (FENO) is regarded as a simple, noninvasive method for assessing eosinophilic airway inflammation. Produced by a variety of cells within the lung, nitric oxide (NO) concentrations are generally low in healthy individuals. However, high concentrations of NO appear to be involved in nonspecific host defense mechanisms and chronic inflammatory  diseases such as asthma. The American Thoracic Society (ATS) therefore recommended using FENO to aid in the diagnosis and monitoring of eosinophilic airway inflammation and asthma, and for identifying steroid responsive individuals whose chronic respiratory symptoms may be caused by airway inflammation \par \par \par  PFT revealed mild restrictive flows, with a FEV1 of 3.07L, which is 73% of predicted, with a normal flow volume loop

## 2022-02-03 ENCOUNTER — TRANSCRIPTION ENCOUNTER (OUTPATIENT)
Age: 52
End: 2022-02-03

## 2022-02-09 ENCOUNTER — TRANSCRIPTION ENCOUNTER (OUTPATIENT)
Age: 52
End: 2022-02-09

## 2022-02-10 ENCOUNTER — APPOINTMENT (OUTPATIENT)
Dept: CARDIOLOGY | Facility: CLINIC | Age: 52
End: 2022-02-10
Payer: COMMERCIAL

## 2022-02-10 ENCOUNTER — NON-APPOINTMENT (OUTPATIENT)
Age: 52
End: 2022-02-10

## 2022-02-10 VITALS
SYSTOLIC BLOOD PRESSURE: 114 MMHG | OXYGEN SATURATION: 99 % | BODY MASS INDEX: 29.16 KG/M2 | WEIGHT: 220 LBS | HEART RATE: 70 BPM | HEIGHT: 73 IN | DIASTOLIC BLOOD PRESSURE: 75 MMHG

## 2022-02-10 PROCEDURE — 99204 OFFICE O/P NEW MOD 45 MIN: CPT

## 2022-02-10 PROCEDURE — 93000 ELECTROCARDIOGRAM COMPLETE: CPT

## 2022-02-10 PROCEDURE — 99214 OFFICE O/P EST MOD 30 MIN: CPT

## 2022-02-10 NOTE — PHYSICAL EXAM
[General Appearance - Well Developed] : well developed [Normal Appearance] : normal appearance [Well Groomed] : well groomed [General Appearance - Well Nourished] : well nourished [No Deformities] : no deformities [General Appearance - In No Acute Distress] : no acute distress [Normal Conjunctiva] : the conjunctiva exhibited no abnormalities [Eyelids - No Xanthelasma] : the eyelids demonstrated no xanthelasmas [Normal Oral Mucosa] : normal oral mucosa [No Oral Pallor] : no oral pallor [No Oral Cyanosis] : no oral cyanosis [Normal Jugular Venous A Waves Present] : normal jugular venous A waves present [Normal Jugular Venous V Waves Present] : normal jugular venous V waves present [No Jugular Venous Le A Waves] : no jugular venous le A waves [Heart Rate And Rhythm] : heart rate and rhythm were normal [Heart Sounds] : normal S1 and S2 [Murmurs] : no murmurs present [Respiration, Rhythm And Depth] : normal respiratory rhythm and effort [Exaggerated Use Of Accessory Muscles For Inspiration] : no accessory muscle use [Auscultation Breath Sounds / Voice Sounds] : lungs were clear to auscultation bilaterally [Abdomen Soft] : soft [Abdomen Tenderness] : non-tender [Abdomen Mass (___ Cm)] : no abdominal mass palpated [Abnormal Walk] : normal gait [Gait - Sufficient For Exercise Testing] : the gait was sufficient for exercise testing [Nail Clubbing] : no clubbing of the fingernails [Cyanosis, Localized] : no localized cyanosis [Petechial Hemorrhages (___cm)] : no petechial hemorrhages [Skin Color & Pigmentation] : normal skin color and pigmentation [] : no rash [No Venous Stasis] : no venous stasis [Skin Lesions] : no skin lesions [No Skin Ulcers] : no skin ulcer [No Xanthoma] : no  xanthoma was observed [Oriented To Time, Place, And Person] : oriented to person, place, and time [Affect] : the affect was normal [Mood] : the mood was normal [No Anxiety] : not feeling anxious

## 2022-02-10 NOTE — DISCUSSION/SUMMARY
[FreeTextEntry1] : Assessment:\par 1.  CVA\par 2. PFO with Closure\par 3.  Afib \par 4 Now in sinus rhythm \par 5.  Atherosclerotic plaque in the aorta\par \par Plan:\par 1.  his [id number] score is 3.  2 points for stroke and 1 point for vascular (athero in the aorta)\par 2.  He should remain on Eliquis 5mg BID\par 3.  He should continue aspirin 81mg daily \par 4.  Continue statin therapy \par 5.  Followup gen cardio care with Dr. Baig\par 6.  D/W Dr. Tai and Dr. Amaral neurology \par 7.  Urology eval for ED

## 2022-02-10 NOTE — REASON FOR VISIT
[Initial Evaluation] : an initial evaluation of [FreeTextEntry2] : AC management  [FreeTextEntry1] : 2018 had headache, MRI showed a right sided cyst.  1 year later, seen by neurologist, another MRI, was told had a stroke. March 2020  had loop recorder placed Francois Marmolejo.      May 1st 2021 felt dizzy with R leg and arm numbness/weakness.  Was admitted to [facility].  Confirmed to have a stroke L corona radiata.  \par Neuro Dr. Amaral.  Was found to have a PFO.  Seen by Dr. Lanza, PFO was closed July 7th 2021\par Aug 2021, was found to have afib. Dr. Jose Baig, was started on beta-blockers and eliquis.  Plavix was stopped.  Aspirin was continued.  2 - 3 weeks later sinus.  Was taken off beta-blocker. No more afib.\par \par Was seen by Dr. Tai.  Suggested that he sees me for a/c management. \par \par Meds\par Crestor 10\par eliquis 5mg BID\par Aspirin 81mg daily \par \par Never smoker\par

## 2022-02-18 ENCOUNTER — APPOINTMENT (OUTPATIENT)
Dept: UROLOGY | Facility: CLINIC | Age: 52
End: 2022-02-18
Payer: COMMERCIAL

## 2022-02-18 PROCEDURE — 99213 OFFICE O/P EST LOW 20 MIN: CPT

## 2022-02-18 RX ORDER — APIXABAN 5 MG/1
5 TABLET, FILM COATED ORAL
Refills: 0 | Status: ACTIVE | COMMUNITY

## 2022-02-18 RX ORDER — CLOPIDOGREL 75 MG/1
75 TABLET, FILM COATED ORAL
Refills: 0 | Status: DISCONTINUED | COMMUNITY
End: 2022-02-18

## 2022-02-18 RX ORDER — SODIUM SULFATE, POTASSIUM SULFATE, MAGNESIUM SULFATE 17.5; 3.13; 1.6 G/ML; G/ML; G/ML
17.5-3.13-1.6 SOLUTION, CONCENTRATE ORAL
Qty: 1 | Refills: 0 | Status: DISCONTINUED | COMMUNITY
Start: 2021-05-20 | End: 2022-02-18

## 2022-02-18 NOTE — ASSESSMENT
[FreeTextEntry1] : We discussed his erectile dysfunction . He was fine 6 months ago but now cannot get an erection . He has desire . He stimulates but there is no fullness or any signs of erections \par We will try Viagra . If no success we will refer to Dr Lind \par  I spent 20 -minutes time today on all issues related to this encounter on today date of service including non face to face time.\par \par

## 2022-02-18 NOTE — HISTORY OF PRESENT ILLNESS
[FreeTextEntry1] : Seen in June 2021 to establish  urological care .\par All was good at that time \par He is here today to discuss loss of erections \par He was fine when he was on Plavix Since he has been on Eliquis  he is unable to achieve erections

## 2022-03-24 RX ORDER — LEVALBUTEROL TARTRATE 45 UG/1
45 AEROSOL, METERED ORAL
Qty: 1 | Refills: 3 | Status: ACTIVE | COMMUNITY
Start: 2022-03-24 | End: 1900-01-01

## 2022-03-24 RX ORDER — GLYCOPYRROLATE AND FORMOTEROL FUMARATE 9; 4.8 UG/1; UG/1
9-4.8 AEROSOL, METERED RESPIRATORY (INHALATION)
Qty: 1 | Refills: 3 | Status: ACTIVE | COMMUNITY
Start: 2022-03-24 | End: 1900-01-01

## 2022-03-25 RX ORDER — CICLESONIDE 160 UG/1
160 AEROSOL, METERED RESPIRATORY (INHALATION) TWICE DAILY
Qty: 2 | Refills: 1 | Status: DISCONTINUED | COMMUNITY
Start: 2022-03-24 | End: 2022-03-25

## 2022-04-01 ENCOUNTER — APPOINTMENT (OUTPATIENT)
Dept: PULMONOLOGY | Facility: CLINIC | Age: 52
End: 2022-04-01
Payer: COMMERCIAL

## 2022-04-01 VITALS — HEIGHT: 73 IN | BODY MASS INDEX: 29.16 KG/M2 | WEIGHT: 220 LBS

## 2022-04-01 PROCEDURE — 99214 OFFICE O/P EST MOD 30 MIN: CPT | Mod: 95

## 2022-04-01 RX ORDER — BECLOMETHASONE DIPROPIONATE HFA 80 UG/1
80 AEROSOL, METERED RESPIRATORY (INHALATION)
Qty: 31.8 | Refills: 1 | Status: ACTIVE | COMMUNITY
Start: 2022-03-25 | End: 1900-01-01

## 2022-04-01 NOTE — HISTORY OF PRESENT ILLNESS
[Home] : at home, [unfilled] , at the time of the visit. [Medical Office: ([facility])___] : at the medical office located in  [Verbal consent obtained from patient] : the patient, [unfilled] [TextBox_4] : Mr. MCKEON is a 52 year old male hx of herniated disk, CVA, loop recorder for ?Afib, colonic polyp, elevated cholesterol, prior GERD, s/p hiatal hernia surgery, childhood asthma,  presenting to the office today via video call for follow up pulmonary evaluation. His chief complaint is\par -he notes coughing every day for the past 2-3 months \par -he has not been using his inhalers regularly \par -He notes active allergies \par -his heartburn and reflux are controlled \par -he notes intermittent SOB, improved after using Bevespi \par -He notes sinus issues are active, with sinus pressure in head nose and ear pressure \par -he is now treated with HEATHER\par \par - He denies any headaches, nausea, vomiting, fever, chills, sweats, chest pain, chest pressure, palpitations, SOB, coughing, wheezing, fatigue, diarrhea, constipation, dysphagia, myalgias, dizziness, leg swelling, leg pain, itchy eyes, itchy ears, heartburn, reflux, or sour taste in the mouth

## 2022-04-01 NOTE — ADDENDUM
[FreeTextEntry1] : Documented by Jerardo Hope acting as a scribe for Dr. Demario Tai on (04/01/2022).\par \par All medical record record entries made by the Scribe were at my, Dr. Demario Tai's, direction and personally dictated by me on (04/01/2022). I have reviewed the chart and agree that the record accurately reflects my personal performance of the history, physical exam, assessment and plan. I have personally directed, reviewed, and agree with the discharge instructions.

## 2022-04-01 NOTE — ASSESSMENT
[FreeTextEntry1] : Mr. MCKEON is a 52 year old male with a history of  herniated disk, CVA, loop recorder for ?Afib, colonic polyp, elevated cholesterol, prior GERD, s/p hiatal hernia surgery, childhood asthma, adult asthma, CVA 5/1/2021 - PFO Dx - awaiting Rx who comes into the office today via video call  for pulmonary evaluation for SOB CVA 5/1/2021 - PFO Dx - s/p closure - complicated by Afib - treated OSAS (DD) , allergies / asthma \par \par The patient's shortness of breath is multifactorial due to:\par -pulmonary disease \par      - Asthma \par      - (+)HEATHER\par -poor breathing mechanics \par -little bit overweight/out of shape\par -?cardiac disease - Dr. Baig ?Afib \par - GERD / LPR\par \par Problem 1: active Asthma (moderate to severe)\par -continue Bevespi 2 inhalations BID\par -continue Alvesco (160) 2 inhalations BID or Qvar Redihaler 80 2 puffs BID\par -continue Ventolin rescue inhaler 2 inhalations before exercise, Q6H (PRN)\par -Add Prednisone 20mg for 7 days then 10mg for 7 days (4/2022)\par -Information sheet given to the patient to be reviewed, this medication is never to be used without consulting the prescribing physician. Proper dietary restraint is necessary specifically salt containing foods, if any reaction may occur should be reported. \par -Asthma is believed to be caused by inherited (genetic) and environmental factor, but its exact cause is unknown. Asthma may be triggered by allergens, lung infections, or irritants in the air. Asthma triggers are different for each person\par \par Problem 1A: Eosinophilic Asthma \par - candidate for Fasenra \par -The safety and efficacy of Nucala was established in three double-blind, randomized, placebo-controlled trials in patients with severe asthma. Compared to a placebo, patients with severe asthma receiving Nucala had fewer exacerbation requiring hospitalization and/or emergency department visits, and a longer time to first exacerbation.  In addition, patients with severe asthma receiving Nucala or Fasenra experienced greater reductions in their daily maintenance oral corticosteroid dose, while maintaining asthma control compared with patients receiving placebo. Treatment with Nucala did not result in a significant improvement in lung function, as measured by the volume of air exhaled by patients in one second. The most common side effects include: headache, injection site reactions, back pain, weakness, and fatigue; hypersensitivity reactions can occur within hours or days including swelling of the face, mouth, and tongue, fainting, dizziness, hives, breathing problems, and rash; herpes zoster infections have occurred. The drug is a monoclonal antibody that inhibits interleukin-5 which helps regular eosinophils, a type of white blood cell that contributes to asthma. The over-production of eosinophils can cause inflammation in the lungs, increasing the frequency of asthma attacks. Patients must also take other medications, including high dose inhaled corticosteroids and at least one additional asthma drug \par \par Problem 2: Allergy / Sinus \par - add Clarinex 5 mg QAM\par -add Qnasl 1 sniff BID (1st)\par -continue Olopatadine 0.6% at 1 sniff/nostril BID (2nd)\par -s/p blood work to include: IgE level, eosinophil level(+), vitamin D level(low), food IgE level(-), and asthma profile(+)\par Environmental measures for allergies were encouraged including mattress and pillow cover, air purifier, and environmental controls.\par \par Problem 2A: Low vitamin D \par - on rx \par Low vitamin D levels have been associated with asthma exacerbations and increased allergic symptoms. The goal based on recent information is maintaining levels between 50-70 and low normal is 30. Recommended 50,000 units every two weeks to once a month depending on the level. \par \par Problem 3: GERD / LPR -  s/p Nissen fundoplication \par -continue Pepcid 40 mg QHS\par -Rule of 2s: avoid eating too much, eating too fast, eating too late, eating too spicy, eating too lousy, eating two hours before bed.\par -Things to avoid including overeating, spicy foods, tight clothing, eating within three hours of bed, this list is not all inclusive. \par -For treatment of reflux, possible options discussed including diet control, H2 blockers, PPIs, as well as coating motility agents discussed as treatment options. Timing of meals and proximity of last meal to sleep were discussed. If symptoms persist, a formal gastrointestinal evaluation is needed.\par \par \par Problem 4: (+)HEATHER(risk: neck size, chronic reflux, poor sleep)\par - Suggested Oral Appliance for sleep apnea (Danoff - dental device) \par - f/p SS \par Sleep apnea is associated with adverse clinical consequences which an affect most organ systems. Cardiovascular disease risk includes arrhythmias, atrial fibrillation, hypertension, coronary artery disease, and stroke. Metabolic disorders include diabetes type 2, non-alcoholic fatty liver disease. Mood disorder especially depression; and cognitive decline especially in the elderly. Associations with chronic reflux/Byrne’s esophagus some but not all inclusive. \par -Reasons include arousal consistent with hypopnea; respiratory events most prominent in REM sleep or supine position; therefore sleep staging and body position are important for accurate diagnosis and estimation of AHI.\par \par Problem 5 : Poor Mechanics of Breathing \par -Recommended Wim Hof and Buteyko breathing techniques \par - Proper breathing techniques were reviewed with an emphasis of exhalation. Patient instructed to breath in for 1 second and out for four seconds. Patient was encouraged to not talk while walking. \par \par Problem 6 : Overweight\par -Recommend "Muniq" OTC Supplement \par -Weight loss, exercise, and diet control were discussed and are highly encouraged. Treatment options were given such as, aqua therapy, and contacting a nutritionist. Recommended to use the elliptical, stationary bike, less use of treadmill. Mindful eating was explained to the patient Obesity is associated with worsening asthma, shortness of breath, and potential for cardiac disease, diabetes, and other underlying medical conditions. \par \par Problem 7 : Cardiac (PFO) \par -recommended to follow up with a cardiologist Tyler/ Divya - s/p PFO closure - complicated by AFib and now on Eliquis\par - recommended to follow up with vascular and neurologist \par \par Problem 8 : health maintenance\par - covid-19 vaccine x3 \par - Recommended Throat Coat Tea (Slippery Elm) \par -s/p influenza vaccine 2021 \par -recommended strep pneumonia vaccines after age 65: Prevnar-13 vaccine, followed by Pneumo vaccine 23 one year following\par -recommended early intervention for URIs\par -recommended regular osteoporosis evaluations\par -recommended early dermatological evaluations\par -recommended after the age of 50 to the age of 70, colonoscopy every 5 years \par \par  Follow up in 6-8 weeks\par -he  is recommended to call with any changes, questions, or concerns.

## 2022-04-11 PROBLEM — Z11.59 SCREENING FOR VIRAL DISEASE: Status: ACTIVE | Noted: 2020-06-05

## 2022-04-26 ENCOUNTER — NON-APPOINTMENT (OUTPATIENT)
Age: 52
End: 2022-04-26

## 2022-04-26 ENCOUNTER — APPOINTMENT (OUTPATIENT)
Dept: UROLOGY | Facility: CLINIC | Age: 52
End: 2022-04-26
Payer: COMMERCIAL

## 2022-04-26 VITALS
HEART RATE: 66 BPM | TEMPERATURE: 97.8 F | OXYGEN SATURATION: 95 % | SYSTOLIC BLOOD PRESSURE: 119 MMHG | DIASTOLIC BLOOD PRESSURE: 77 MMHG | HEIGHT: 74 IN | WEIGHT: 225 LBS | BODY MASS INDEX: 28.88 KG/M2

## 2022-04-26 DIAGNOSIS — I63.9 CEREBRAL INFARCTION, UNSPECIFIED: ICD-10-CM

## 2022-04-26 DIAGNOSIS — Z79.01 LONG TERM (CURRENT) USE OF ANTICOAGULANTS: ICD-10-CM

## 2022-04-26 PROCEDURE — 36415 COLL VENOUS BLD VENIPUNCTURE: CPT

## 2022-04-26 PROCEDURE — 99214 OFFICE O/P EST MOD 30 MIN: CPT

## 2022-04-26 RX ORDER — SILDENAFIL 100 MG/1
100 TABLET, FILM COATED ORAL
Qty: 6 | Refills: 5 | Status: COMPLETED | COMMUNITY
Start: 2022-02-18 | End: 2022-04-26

## 2022-04-26 RX ORDER — PREDNISONE 10 MG/1
10 TABLET ORAL
Qty: 100 | Refills: 0 | Status: COMPLETED | COMMUNITY
Start: 2022-04-01 | End: 2022-04-26

## 2022-04-26 RX ORDER — ALBUTEROL SULFATE 2.5 MG/3ML
(2.5 MG/3ML) SOLUTION RESPIRATORY (INHALATION) 4 TIMES DAILY
Qty: 120 | Refills: 3 | Status: ACTIVE | COMMUNITY
Start: 2022-04-26 | End: 1900-01-01

## 2022-04-26 RX ORDER — TAMSULOSIN HYDROCHLORIDE 0.4 MG/1
0.4 CAPSULE ORAL
Qty: 30 | Refills: 0 | Status: COMPLETED | COMMUNITY
Start: 2021-06-01 | End: 2022-04-26

## 2022-04-26 NOTE — ASSESSMENT
[FreeTextEntry1] : Low libido \par -check labs- cbc and fret\par \par Erectile dysfunction occasional\par -start lose dose cialis.\par \par I saw and evaluated the patient with nurse practitioner Wiliam Juarez. \par I have confirmed the chief complaint, past medical, surgical, and social history.\par I have examined the patient. \par I have reviewed the note and interpreted auxiliary tests results. \par I have formulated the assessment and plan and discussed my recommendations of care to the nurse practitioner.\par I agree with the findings and the plan of care as documented by this  note which I edited as necessary.\par \par \par The submitted E/M billing level for this visit reflects the total time spent on the day of the visit including face-to-face time spent with the patient, non-face-to-face review of medical records and relevant information, documentation, and asynchronous communication with the patient after a visit via phone, email, or patient’s eHR portal after the visit.  \par \par The medical records reviewed are either scanned into the chart or reviewed with the patient using a patient’s electronic medical records portal for patients with records not available to [facility] via electronic transmission platforms from other institutions and labs. \par \par Time spend counseling and performing coordination of care was also included in determining the appropriate EM billing level.\par \par I have reviewed and verified information regarding the chief complaint and history recorded by the ancillary staff and/or the patient. I have independently reviewed and interpreted tests performed by other physicians and facilities as necessary. \par \par I have discussed with the patient differential diagnosis, reason for auxiliary tests if ordered, risks, benefits, alternatives, and complications of each form of therapy were discussed.

## 2022-04-26 NOTE — HISTORY OF PRESENT ILLNESS
[FreeTextEntry1] : NICCI MCKEON, a 52 year old male, presented to the office with the chief complaint of erectile dysfunction and low libido.\par \par Patient suffered unexplained stroke last year and will be on eliquis for long term. PFO closure done last year. Following up with Dr. Joiner for possible a-fib. \par \par Has noticed since stroke issues with erections and libido\par \par Morning erections occasionally \par \par Maintains erections for intercourse; most of problem is libido.\par \par Ejaculate "watery" now\par \par No urinary issues

## 2022-04-26 NOTE — REVIEW OF SYSTEMS
[Dry Eyes] : dryness of the eyes [Wheezing] : wheezing [Loss of interest] : loss of interest in sexual activity [Dizziness] : dizziness [Negative] : Heme/Lymph

## 2022-04-26 NOTE — PHYSICAL EXAM
[General Appearance - Well Developed] : well developed [General Appearance - Well Nourished] : well nourished [Normal Appearance] : normal appearance [Well Groomed] : well groomed [General Appearance - In No Acute Distress] : no acute distress [Respiration, Rhythm And Depth] : normal respiratory rhythm and effort [Exaggerated Use Of Accessory Muscles For Inspiration] : no accessory muscle use [Normal Station and Gait] : the gait and station were normal for the patient's age [] : no rash [No Focal Deficits] : no focal deficits [Oriented To Time, Place, And Person] : oriented to person, place, and time [Affect] : the affect was normal [Mood] : the mood was normal [Not Anxious] : not anxious [Urethral Meatus] : meatus normal [Penis Abnormality] : normal circumcised penis [FreeTextEntry1] : testis descended; no penile scarring

## 2022-05-09 ENCOUNTER — APPOINTMENT (OUTPATIENT)
Dept: PULMONOLOGY | Facility: CLINIC | Age: 52
End: 2022-05-09

## 2022-05-18 ENCOUNTER — NON-APPOINTMENT (OUTPATIENT)
Age: 52
End: 2022-05-18

## 2022-05-18 ENCOUNTER — APPOINTMENT (OUTPATIENT)
Dept: PULMONOLOGY | Facility: CLINIC | Age: 52
End: 2022-05-18
Payer: COMMERCIAL

## 2022-05-18 VITALS
DIASTOLIC BLOOD PRESSURE: 80 MMHG | RESPIRATION RATE: 17 BRPM | BODY MASS INDEX: 28.88 KG/M2 | HEIGHT: 74 IN | SYSTOLIC BLOOD PRESSURE: 110 MMHG | WEIGHT: 225 LBS | OXYGEN SATURATION: 96 % | HEART RATE: 71 BPM | TEMPERATURE: 96.8 F

## 2022-05-18 PROCEDURE — 95012 NITRIC OXIDE EXP GAS DETER: CPT

## 2022-05-18 PROCEDURE — 94010 BREATHING CAPACITY TEST: CPT

## 2022-05-18 PROCEDURE — 99214 OFFICE O/P EST MOD 30 MIN: CPT | Mod: 25

## 2022-05-18 RX ORDER — PREDNISONE 10 MG/1
10 TABLET ORAL
Qty: 7 | Refills: 0 | Status: DISCONTINUED | COMMUNITY
Start: 2022-04-26 | End: 2022-05-18

## 2022-05-24 ENCOUNTER — NON-APPOINTMENT (OUTPATIENT)
Age: 52
End: 2022-05-24

## 2022-05-24 RX ORDER — BECLOMETHASONE DIPROPIONATE 80 UG/1
80 AEROSOL, METERED NASAL
Qty: 3 | Refills: 1 | Status: ACTIVE | COMMUNITY
Start: 2022-04-01 | End: 1900-01-01

## 2022-05-26 ENCOUNTER — NON-APPOINTMENT (OUTPATIENT)
Age: 52
End: 2022-05-26

## 2022-05-28 ENCOUNTER — NON-APPOINTMENT (OUTPATIENT)
Age: 52
End: 2022-05-28

## 2022-06-07 NOTE — ADDENDUM
[FreeTextEntry1] : Documented by Quincy Samuel acting as a scribe for Dr. Demario Tai on (05/18/2022).\par \par All medical record entries made by the Scribe were at my, Dr. Demario Tai's, direction and personally dictated by me on (05/18/2022). I have reviewed the chart and agree that the record accurately reflects my personal performance of the history, physical exam, assessment and plan. I have also personally directed, reviewed, and agree with the discharge instructions.\par

## 2022-06-07 NOTE — ASSESSMENT
[FreeTextEntry1] : Mr. MCKEON is a 52 year old male with a history of  herniated disk, CVA, loop recorder for ?Afib, colonic polyp, elevated cholesterol, prior GERD, s/p hiatal hernia surgery, childhood asthma, severe persistent asthma, CVA 5/1/2021 - PFO Dx - awaiting Rx who comes into the office today for pulmonary evaluation for SOB CVA 5/1/2021 - PFO Dx - s/p closure - complicated by Afib - treated OSAS (DD) , allergies / severe persistent / eosinophilic asthma - poorly controlled\par \par The patient's shortness of breath is multifactorial due to:\par -pulmonary disease \par      - Asthma \par      - (+)HEATHER\par -poor breathing mechanics \par -little bit overweight/out of shape\par -?cardiac disease - Dr. Baig ?Afib \par - GERD / LPR\par \par Problem 1: active Asthma (moderate to severe) -active\par -continue Bevespi 2 inhalations BID\par -continue Alvesco (160) 2 inhalations BID or Qvar Redihaler 80 2 puffs BID\par -continue Ventolin rescue inhaler 2 inhalations before exercise, Q6H (PRN)\par -s/p Prednisone 20mg for 7 days then 10mg for 7 days (4/2022)\par -Information sheet given to the patient to be reviewed, this medication is never to be used without consulting the prescribing physician. Proper dietary restraint is necessary specifically salt containing foods, if any reaction may occur should be reported. \par -Asthma is believed to be caused by inherited (genetic) and environmental factor, but its exact cause is unknown. Asthma may be triggered by allergens, lung infections, or irritants in the air. Asthma triggers are different for each person\par \par Problem 1A: Eosinophilic Asthma \par - candidate for Fasenra or Nucala 100 q month (initiate) Eosinophil level 200 (4/28/2022)\par -The safety and efficacy of Nucala was established in three double-blind, randomized, placebo-controlled trials in patients with severe asthma. Compared to a placebo, patients with severe asthma receiving Nucala had fewer exacerbation requiring hospitalization and/or emergency department visits, and a longer time to first exacerbation.  In addition, patients with severe asthma receiving Nucala or Fasenra experienced greater reductions in their daily maintenance oral corticosteroid dose, while maintaining asthma control compared with patients receiving placebo. Treatment with Nucala did not result in a significant improvement in lung function, as measured by the volume of air exhaled by patients in one second. The most common side effects include: headache, injection site reactions, back pain, weakness, and fatigue; hypersensitivity reactions can occur within hours or days including swelling of the face, mouth, and tongue, fainting, dizziness, hives, breathing problems, and rash; herpes zoster infections have occurred. The drug is a monoclonal antibody that inhibits interleukin-5 which helps regular eosinophils, a type of white blood cell that contributes to asthma. The over-production of eosinophils can cause inflammation in the lungs, increasing the frequency of asthma attacks. Patients must also take other medications, including high dose inhaled corticosteroids and at least one additional asthma drug \par \par Problem 2: Allergy / Sinus \par - add Clarinex 5 mg QAM\par -add Qnasl 1 sniff BID (1st)\par -continue Olopatadine 0.6% at 1 sniff/nostril BID (2nd)\par -s/p blood work to include: IgE level, eosinophil level(+), vitamin D level(low), food IgE level(-), and asthma profile(+)\par Environmental measures for allergies were encouraged including mattress and pillow cover, air purifier, and environmental controls.\par \par Problem 2A: Low vitamin D \par - on rx \par Low vitamin D levels have been associated with asthma exacerbations and increased allergic symptoms. The goal based on recent information is maintaining levels between 50-70 and low normal is 30. Recommended 50,000 units every two weeks to once a month depending on the level. \par \par Problem 3: GERD / LPR -  s/p Nissen fundoplication \par -continue Pepcid 40 mg QHS\par -Rule of 2s: avoid eating too much, eating too fast, eating too late, eating too spicy, eating too lousy, eating two hours before bed.\par -Things to avoid including overeating, spicy foods, tight clothing, eating within three hours of bed, this list is not all inclusive. \par -For treatment of reflux, possible options discussed including diet control, H2 blockers, PPIs, as well as coating motility agents discussed as treatment options. Timing of meals and proximity of last meal to sleep were discussed. If symptoms persist, a formal gastrointestinal evaluation is needed.\par \par \par Problem 4: (+)HEATHER(risk: neck size, chronic reflux, poor sleep)\par - Suggested Oral Appliance for sleep apnea (Danoff - dental device) \par - f/p SS \par Sleep apnea is associated with adverse clinical consequences which an affect most organ systems. Cardiovascular disease risk includes arrhythmias, atrial fibrillation, hypertension, coronary artery disease, and stroke. Metabolic disorders include diabetes type 2, non-alcoholic fatty liver disease. Mood disorder especially depression; and cognitive decline especially in the elderly. Associations with chronic reflux/Byrne’s esophagus some but not all inclusive. \par -Reasons include arousal consistent with hypopnea; respiratory events most prominent in REM sleep or supine position; therefore sleep staging and body position are important for accurate diagnosis and estimation of AHI.\par \par Problem 5 : Poor Mechanics of Breathing \par -Recommended Wim Hof and Buteyko breathing techniques \par - Proper breathing techniques were reviewed with an emphasis of exhalation. Patient instructed to breath in for 1 second and out for four seconds. Patient was encouraged to not talk while walking. \par \par Problem 6 : Overweight\par -Recommend "Muniq" OTC Supplement \par -Weight loss, exercise, and diet control were discussed and are highly encouraged. Treatment options were given such as, aqua therapy, and contacting a nutritionist. Recommended to use the elliptical, stationary bike, less use of treadmill. Mindful eating was explained to the patient Obesity is associated with worsening asthma, shortness of breath, and potential for cardiac disease, diabetes, and other underlying medical conditions. \par \par Problem 7 : Cardiac (PFO) \par -recommended to follow up with a cardiologist Tyler/ Divya - s/p PFO closure - complicated by AFib and now on Eliquis\par - recommended to follow up with vascular and neurologist \par \par Problem 8 : health maintenance\par - covid-19 vaccine x3 \par - Recommended Throat Coat Tea ([place]) \par -s/p influenza vaccine 2021 \par -recommended strep pneumonia vaccines after age 65: Prevnar-13 vaccine, followed by Pneumo vaccine 23 one year following\par -recommended early intervention for URIs\par -recommended regular osteoporosis evaluations\par -recommended early dermatological evaluations\par -recommended after the age of 50 to the age of 70, colonoscopy every 5 years \par \par  Follow up in 6-8 weeks\par -he  is recommended to call with any changes, questions, or concerns.

## 2022-06-07 NOTE — PHYSICAL EXAM
[No Acute Distress] : no acute distress [Normal Oropharynx] : normal oropharynx [Normal Appearance] : normal appearance [No Neck Mass] : no neck mass [Normal Rate/Rhythm] : normal rate/rhythm [Normal S1, S2] : normal s1, s2 [No Murmurs] : no murmurs [No Resp Distress] : no resp distress Other [Clear to Auscultation Bilaterally] : clear to auscultation bilaterally [No Abnormalities] : no abnormalities [Benign] : benign [Normal Gait] : normal gait [No Clubbing] : no clubbing [No Cyanosis] : no cyanosis [No Edema] : no edema [FROM] : FROM [Normal Color/ Pigmentation] : normal color/ pigmentation [No Focal Deficits] : no focal deficits [Oriented x3] : oriented x3 [Normal Affect] : normal affect [II] : Mallampati Class: II [TextBox_68] : I:E 1:3; Clear

## 2022-06-07 NOTE — HISTORY OF PRESENT ILLNESS
[TextBox_4] : Mr. MCKEON is a 52 year old male hx of herniated disk, CVA, loop recorder for ?Afib, colonic polyp, elevated cholesterol, prior GERD, s/p hiatal hernia surgery, severe persistent asthma,  presenting to the office today for follow up pulmonary evaluation. His chief complaint is\par -he is now monitored for Afib by Dr. Joiner\par -He was admitted into [facility] due to asthma flair and course of steroids was increased at the time \par -his reflux has remained quiet \par -denies any visual issues\par -He notes intermittent hoarseness\par -he notes his bowels are regular \par -his weight is stable \par -He is now walking for exercise \par -He notes his blood sugar is slightly elevated \par -his sinuses are congested \par -he notes SOB, wheezing from january to april for the past 3 years \par -He is waking up in the middle of the night with difficulty falling back to sleep despite use of oral appliance c\par - patient denies any headaches, nausea, vomiting, fever, chills, sweats, chest pain, chest pressure, palpitations, coughing, wheezing, fatigue, diarrhea, constipation, dysphagia, myalgias, dizziness, leg swelling, leg pain, itchy eyes, itchy ears, heartburn, or sour taste in the mouth

## 2022-06-07 NOTE — PROCEDURE
[FreeTextEntry1] : Feno was 24; a normal value being less than 25. Fractional exhaled nitric oxide (FENO) is regarded as a simple, noninvasive method for assessing eosinophilic airway inflammation. Produced by a variety of cells within the lung, nitric oxide (NO) concentrations are generally low in healthy individuals. However, high concentrations of NO appear to be involved in nonspecific host defense mechanisms and chronic inflammatory  diseases such as asthma. The American Thoracic Society (ATS) therefore recommended using FENO to aid in the diagnosis and monitoring of eosinophilic airway inflammation and asthma, and for identifying steroid responsive individuals whose chronic respiratory symptoms may be caused by airway inflammation \par \par PFT revealed mild restrictive dysfunction with a FEV1 of 3.33L, which is 76% of predicted, with a normal flow volume loop\par  \par \par -Images and procedures reviewed in detail and discussed with patient.

## 2022-06-09 ENCOUNTER — TRANSCRIPTION ENCOUNTER (OUTPATIENT)
Age: 52
End: 2022-06-09

## 2022-06-10 ENCOUNTER — TRANSCRIPTION ENCOUNTER (OUTPATIENT)
Age: 52
End: 2022-06-10

## 2022-06-13 ENCOUNTER — TRANSCRIPTION ENCOUNTER (OUTPATIENT)
Age: 52
End: 2022-06-13

## 2022-07-07 ENCOUNTER — APPOINTMENT (OUTPATIENT)
Dept: PULMONOLOGY | Facility: CLINIC | Age: 52
End: 2022-07-07

## 2022-07-07 VITALS
RESPIRATION RATE: 16 BRPM | WEIGHT: 221 LBS | TEMPERATURE: 97.7 F | HEIGHT: 74 IN | HEART RATE: 77 BPM | DIASTOLIC BLOOD PRESSURE: 70 MMHG | BODY MASS INDEX: 28.36 KG/M2 | SYSTOLIC BLOOD PRESSURE: 110 MMHG | OXYGEN SATURATION: 97 %

## 2022-07-07 PROCEDURE — 98960 EDU&TRN PT SELF-MGMT NQHP 1: CPT

## 2022-07-20 ENCOUNTER — APPOINTMENT (OUTPATIENT)
Dept: PULMONOLOGY | Facility: CLINIC | Age: 52
End: 2022-07-20

## 2022-07-20 ENCOUNTER — TRANSCRIPTION ENCOUNTER (OUTPATIENT)
Age: 52
End: 2022-07-20

## 2022-07-20 DIAGNOSIS — G47.33 OBSTRUCTIVE SLEEP APNEA (ADULT) (PEDIATRIC): ICD-10-CM

## 2022-07-20 PROCEDURE — 99214 OFFICE O/P EST MOD 30 MIN: CPT | Mod: 95

## 2022-07-20 RX ORDER — OFLOXACIN 3 MG/ML
0.3 SOLUTION/ DROPS OPHTHALMIC
Qty: 5 | Refills: 0 | Status: DISCONTINUED | COMMUNITY
Start: 2022-05-27

## 2022-07-20 RX ORDER — AMOXICILLIN 500 MG/1
500 CAPSULE ORAL
Qty: 20 | Refills: 0 | Status: DISCONTINUED | COMMUNITY
Start: 2022-05-29

## 2022-07-20 RX ORDER — FINASTERIDE 1 MG/1
1 TABLET ORAL
Qty: 30 | Refills: 0 | Status: ACTIVE | COMMUNITY
Start: 2022-07-06

## 2022-07-20 RX ORDER — BUDESONIDE 0.5 MG/2ML
0.5 INHALANT ORAL TWICE DAILY
Qty: 60 | Refills: 3 | Status: ACTIVE | COMMUNITY
Start: 2022-07-20 | End: 1900-01-01

## 2022-07-20 RX ORDER — AMOXICILLIN AND CLAVULANATE POTASSIUM 500; 125 MG/1; MG/1
500-125 TABLET, FILM COATED ORAL
Qty: 20 | Refills: 0 | Status: DISCONTINUED | COMMUNITY
Start: 2022-06-01

## 2022-07-20 NOTE — HISTORY OF PRESENT ILLNESS
[Home] : at home, [unfilled] , at the time of the visit. [Medical Office: ([facility])___] : at the medical office located in  [Verbal consent obtained from patient] : the patient, [unfilled] [TextBox_4] : Mr. MCKEON is a 52 year old male hx of herniated disk, CVA, loop recorder for ?Afib, colonic polyp, elevated cholesterol, prior GERD, s/p hiatal hernia surgery, severe persistent asthma,  presenting to the office today via video call for follow up pulmonary evaluation. His chief complaint is\par -he notes that he went to [place] and came home Sunday feeling fine but did not sleep well\par -he notes feeling like he had a flu on Monday morning\par -he notes he tested positive for COVID on Tuesday\par -he notes a cough, chest tightness\par -he notes that he has been sick for 48 hours\par -he notes chills and sweats\par -he denies a persistent fever\par -he denies any visual issues \par -he notes that his appetite is good \par -he notes hoarseness \par -he notes that he has been taking Mucinex, Tylenol, and a nasal spray\par -he notes that he has not been using a nebulizer\par -he notes that he got a Nucala shot two weeks ago\par -he notes that he is on Eloquis\par \par \par -patient denies any headaches, nausea, vomiting, fever, chest pain, palpitations, wheezing, fatigue, diarrhea, constipation, dysphagia, myalgias, dizziness, leg swelling, leg pain, itchy eyes, itchy ears, heartburn, reflux or sour taste in the mouth

## 2022-07-20 NOTE — REASON FOR VISIT
[Follow-Up] : a follow-up visit [TextBox_44] : via video call-SOB, (+)HEATHER, allergies, severe persistent asthma

## 2022-07-20 NOTE — ADDENDUM
[FreeTextEntry1] : Documented by Portillo Kim acting as a scribe for Dr. Demario Tai on 07/20/2022.  All medical record entries made by the Scribe were at my, Dr. Demario Tai's, direction and personally dictated by me on 07/20/2022. I have reviewed the chart and agree that the record accurately reflects my personal performance of the history, physical exam, assessment and plan. I have also personally directed, reviewed, and agree with the discharge instructions.

## 2022-07-20 NOTE — ASSESSMENT
[FreeTextEntry1] : Mr. MCKEON is a 52 year old male with a history of  herniated disk, CVA, loop recorder for ?Afib, colonic polyp, elevated cholesterol, prior GERD, s/p hiatal hernia surgery, childhood asthma, severe persistent asthma, CVA 5/1/2021 - PFO Dx - awaiting Rx who comes into the office today for pulmonary evaluation via video call for SOB CVA 5/1/2021 - PFO Dx - s/p closure - complicated by Afib - treated OSAS (DD) , allergies / severe persistent / eosinophilic asthma - poorly controlled - now with COVID-19 (symptomatic from 7/18/2022)\par \par The patient's shortness of breath is multifactorial due to:\par -pulmonary disease \par      - Asthma \par      - (+)HEATHER\par -poor breathing mechanics \par -little bit overweight/out of shape\par -?cardiac disease - Dr. Baig ?Afib \par - GERD / LPR\par \par Problem 1: active Asthma (moderate to severe) -active\par -continue Bevespi 2 inhalations BID\par -continue Alvesco (160) 2 inhalations BID or Qvar Redihaler 80 2 puffs BID\par -continue Ventolin rescue inhaler 2 inhalations before exercise, Q6H (PRN)\par -s/p Prednisone 20mg for 7 days then 10mg for 7 days (4/2022)\par -Information sheet given to the patient to be reviewed, this medication is never to be used without consulting the prescribing physician. Proper dietary restraint is necessary specifically salt containing foods, if any reaction may occur should be reported. \par -Asthma is believed to be caused by inherited (genetic) and environmental factor, but its exact cause is unknown. Asthma may be triggered by allergens, lung infections, or irritants in the air. Asthma triggers are different for each person\par \par Problem 1A: Eosinophilic Asthma \par - candidate for Fasenra or Nucala 100 q month (initiate) Eosinophil level 200 (4/28/2022)\par -The safety and efficacy of Nucala was established in three double-blind, randomized, placebo-controlled trials in patients with severe asthma. Compared to a placebo, patients with severe asthma receiving Nucala had fewer exacerbation requiring hospitalization and/or emergency department visits, and a longer time to first exacerbation.  In addition, patients with severe asthma receiving Nucala or Fasenra experienced greater reductions in their daily maintenance oral corticosteroid dose, while maintaining asthma control compared with patients receiving placebo. Treatment with Nucala did not result in a significant improvement in lung function, as measured by the volume of air exhaled by patients in one second. The most common side effects include: headache, injection site reactions, back pain, weakness, and fatigue; hypersensitivity reactions can occur within hours or days including swelling of the face, mouth, and tongue, fainting, dizziness, hives, breathing problems, and rash; herpes zoster infections have occurred. The drug is a monoclonal antibody that inhibits interleukin-5 which helps regular eosinophils, a type of white blood cell that contributes to asthma. The over-production of eosinophils can cause inflammation in the lungs, increasing the frequency of asthma attacks. Patients must also take other medications, including high dose inhaled corticosteroids and at least one additional asthma drug \par \par Problem 1B: Symptomatic COVID-19 infection 7/2022(symptomatic since 7/18/2022)\par -Paxovid unable due to Eloquis\par -set up MAB infusions\par -add Albuterol 0.83% via nebulizer, Q6H\par -Add budesonide 0.5 BID \par -recommended quarantine for 8-10 days\par -recommended Yessy-[place] Cold and Flu \par -recommended Fisherman's Friend lozenges and Slippery Elm Throat Kote Tea \par \par Problem 2: Allergy / Sinus \par - add Clarinex 5 mg QAM\par -add Qnasl 1 sniff BID (1st)\par -continue Olopatadine 0.6% at 1 sniff/nostril BID (2nd)\par -s/p blood work to include: IgE level, eosinophil level(+), vitamin D level(low), food IgE level(-), and asthma profile(+)\par Environmental measures for allergies were encouraged including mattress and pillow cover, air purifier, and environmental controls.\par \par Problem 2A: Low vitamin D \par - on rx \par Low vitamin D levels have been associated with asthma exacerbations and increased allergic symptoms. The goal based on recent information is maintaining levels between 50-70 and low normal is 30. Recommended 50,000 units every two weeks to once a month depending on the level. \par \par Problem 3: GERD / LPR -  s/p Nissen fundoplication \par -continue Pepcid 40 mg QHS\par -Rule of 2s: avoid eating too much, eating too fast, eating too late, eating too spicy, eating too lousy, eating two hours before bed.\par -Things to avoid including overeating, spicy foods, tight clothing, eating within three hours of bed, this list is not all inclusive. \par -For treatment of reflux, possible options discussed including diet control, H2 blockers, PPIs, as well as coating motility agents discussed as treatment options. Timing of meals and proximity of last meal to sleep were discussed. If symptoms persist, a formal gastrointestinal evaluation is needed.\par \par \par Problem 4: (+)HEATHER(risk: neck size, chronic reflux, poor sleep)\par - Suggested Oral Appliance for sleep apnea (Danoff - dental device) \par - f/p SS \par Sleep apnea is associated with adverse clinical consequences which an affect most organ systems. Cardiovascular disease risk includes arrhythmias, atrial fibrillation, hypertension, coronary artery disease, and stroke. Metabolic disorders include diabetes type 2, non-alcoholic fatty liver disease. Mood disorder especially depression; and cognitive decline especially in the elderly. Associations with chronic reflux/Byrne’s esophagus some but not all inclusive. \par -Reasons include arousal consistent with hypopnea; respiratory events most prominent in REM sleep or supine position; therefore sleep staging and body position are important for accurate diagnosis and estimation of AHI.\par \par Problem 5 : Poor Mechanics of Breathing \par -Recommended Wim Hof and Buteyko breathing techniques \par - Proper breathing techniques were reviewed with an emphasis of exhalation. Patient instructed to breath in for 1 second and out for four seconds. Patient was encouraged to not talk while walking. \par \par Problem 6 : Overweight\par -Recommend "Muniq" OTC Supplement \par -Weight loss, exercise, and diet control were discussed and are highly encouraged. Treatment options were given such as, aqua therapy, and contacting a nutritionist. Recommended to use the elliptical, stationary bike, less use of treadmill. Mindful eating was explained to the patient Obesity is associated with worsening asthma, shortness of breath, and potential for cardiac disease, diabetes, and other underlying medical conditions. \par \par Problem 7 : Cardiac (PFO) \par -recommended to follow up with a cardiologist Tyler/ Divya - s/p PFO closure - complicated by AFib and now on Eliquis\par - recommended to follow up with vascular and neurologist \par \par Problem 8 : health maintenance\par - covid-19 vaccine x3 \par - Recommended Throat Coat Tea (Slippery Elm) \par -s/p influenza vaccine 2021 \par -recommended strep pneumonia vaccines after age 65: Prevnar-13 vaccine, followed by Pneumo vaccine 23 one year following\par -recommended early intervention for URIs\par -recommended regular osteoporosis evaluations\par -recommended early dermatological evaluations\par -recommended after the age of 50 to the age of 70, colonoscopy every 5 years \par \par  Follow up in 6-8 weeks\par -he  is recommended to call with any changes, questions, or concerns.

## 2022-07-22 ENCOUNTER — OUTPATIENT (OUTPATIENT)
Dept: OUTPATIENT SERVICES | Facility: HOSPITAL | Age: 52
LOS: 1 days | End: 2022-07-22

## 2022-07-22 ENCOUNTER — APPOINTMENT (OUTPATIENT)
Dept: DISASTER EMERGENCY | Facility: HOSPITAL | Age: 52
End: 2022-07-22

## 2022-07-22 VITALS
SYSTOLIC BLOOD PRESSURE: 122 MMHG | HEART RATE: 70 BPM | DIASTOLIC BLOOD PRESSURE: 79 MMHG | OXYGEN SATURATION: 95 % | RESPIRATION RATE: 17 BRPM | TEMPERATURE: 98 F

## 2022-07-22 VITALS
HEART RATE: 68 BPM | OXYGEN SATURATION: 96 % | DIASTOLIC BLOOD PRESSURE: 81 MMHG | WEIGHT: 220.46 LBS | SYSTOLIC BLOOD PRESSURE: 122 MMHG | HEIGHT: 74 IN | TEMPERATURE: 98 F | RESPIRATION RATE: 17 BRPM

## 2022-07-22 DIAGNOSIS — Z87.19 PERSONAL HISTORY OF OTHER DISEASES OF THE DIGESTIVE SYSTEM: Chronic | ICD-10-CM

## 2022-07-22 DIAGNOSIS — U07.1 COVID-19: ICD-10-CM

## 2022-07-22 RX ORDER — BEBTELOVIMAB 87.5 MG/ML
175 INJECTION, SOLUTION INTRAVENOUS ONCE
Refills: 0 | Status: COMPLETED | OUTPATIENT
Start: 2022-07-22 | End: 2022-07-22

## 2022-07-22 RX ADMIN — BEBTELOVIMAB 175 MILLIGRAM(S): 87.5 INJECTION, SOLUTION INTRAVENOUS at 07:55

## 2022-07-22 NOTE — MONOCLONAL ANTIBODY INFUSION - ASSESSMENT AND PLAN
51 y/o M/F with PMHx of Asthma & CVA and recently diagnosed with Covid-19 infection who was referred for monoclonal antibody infusion after testing positive for COVID 19 on 7/19/22.  Patient states he has been experiencing body aches, diarrhea, headache, fatigue, cough.        PLAN:  - Injection procedure explained to patient   - Consent for monoclonal antibody injection obtained   - Risk & benefits discussed/all questions answered  - Inject Bebtelovimab 175 mg over 1 minutes   - Observe patient for one hour post administration    I have reviewed the Bebtelovimab Emergency Use Authorization (EUA) and I have provided the patient or patient's caregiver with the following information:    1. FDA has authorized emergency use Bebtelovimab, which is not an FDA-approved biological product.  2. The patient or patient's caregiver has the option to accept or refuse administration of Bebtelovimab.  3. The significant known and potential risks and benefits of Bebtelovimab and the extent to which such risks and benefits are unknown.  4. Information on available alternative treatments and risks and benefits of those alternatives.    The patient's COVID monoclonal antibody injection administration went well without any complications. The patient tolerated the treatment without any reactions. Vitals were stable throughout the injection & post-injection administration. The pt denies any CP, fevers, chills, SOB, numbness/tingling in b/l limbs, loss of sensation or motor function, N/V/D while receiving the injection. Patient denies any symptoms an hour after post injection. Vitals were taken post injection and were stable. Pt is medically stable to be discharged home. Discharge instructions were provided to the patient with a fact sheet included. Patient was instructed to self-isolate and use infection control measures (e.g wear mask, isolate, social distance, avoid sharing personal items, clean and disinfect "high touch" surfaces, and frequent handwashing according to the CDC guidelines. The patient was informed on what symptoms to be aware of for the next couple of days, and if there are any issues to call the 24/7 clinical call center. Patient was instructed to follow up with PCP as needed.

## 2022-07-22 NOTE — MONOCLONAL ANTIBODY INFUSION - HOME MEDICATIONS
clopidogrel 75 mg oral tablet , 1 tab(s) orally once a day  aspirin 81 mg oral tablet, chewable , 1 tab(s) orally once a day  acetaminophen 325 mg oral tablet , 2 tab(s) orally every 6 hours, As needed, Mild Pain (1 - 3)  Ventolin HFA 90 mcg/inh inhalation aerosol , 2 puff(s) inhaled As Needed  Crestor 5 mg oral tablet , 1 tab(s) orally once a day

## 2022-08-04 LAB
PROLACTIN SERPL-MCNC: 9 NG/ML
TESTOST FREE SERPL-MCNC: 8.4 PG/ML
TESTOST SERPL-MCNC: 366 NG/DL

## 2022-09-22 ENCOUNTER — APPOINTMENT (OUTPATIENT)
Dept: PULMONOLOGY | Facility: CLINIC | Age: 52
End: 2022-09-22

## 2022-09-22 ENCOUNTER — NON-APPOINTMENT (OUTPATIENT)
Age: 52
End: 2022-09-22

## 2022-09-22 VITALS
WEIGHT: 220 LBS | TEMPERATURE: 97 F | SYSTOLIC BLOOD PRESSURE: 110 MMHG | BODY MASS INDEX: 28.23 KG/M2 | RESPIRATION RATE: 17 BRPM | HEIGHT: 74 IN | HEART RATE: 74 BPM | DIASTOLIC BLOOD PRESSURE: 70 MMHG | OXYGEN SATURATION: 98 %

## 2022-09-22 PROCEDURE — 94010 BREATHING CAPACITY TEST: CPT

## 2022-09-22 PROCEDURE — 99214 OFFICE O/P EST MOD 30 MIN: CPT | Mod: 25

## 2022-09-22 PROCEDURE — 95012 NITRIC OXIDE EXP GAS DETER: CPT

## 2022-09-22 NOTE — PROCEDURE
[FreeTextEntry1] : PFT revealed very mild restrictive dysfunction, with a FEV1 of 3.15L, which is 72% of predicted, with a normal flow volume loop \par \par Feno was 18; a normal value being less than 25. Fractional exhaled nitric oxide (FENO) is regarded as a simple, noninvasive method for assessing eosinophilic airway inflammation. Produced by a variety of cells within the lung, nitric oxide (NO) concentrations are generally low in healthy individuals. However, high concentrations of NO appear to be involved in nonspecific host defense mechanisms and chronic inflammatory  diseases such as asthma. The American Thoracic Society (ATS) therefore recommended using FENO to aid in the diagnosis and monitoring of eosinophilic airway inflammation and asthma, and for identifying steroid responsive individuals whose chronic respiratory symptoms may be caused by airway inflammation

## 2022-09-22 NOTE — ADDENDUM
[FreeTextEntry1] : Documented by Portillo Kim acting as a scribe for Dr. Demario Tai on 09/22/2022.\par \par All medical record entries made by the Scribe were at my, Dr. Demario Tai's, direction and personally dictated by me on 09/22/2022. I have reviewed the chart and agree that the record accurately reflects my personal performance of the history, physical exam, assessment and plan. I have also personally directed, reviewed, and agree with the discharge instructions.

## 2022-09-22 NOTE — HISTORY OF PRESENT ILLNESS
[TextBox_4] : Mr. MCKEON is a 52 year old male hx of herniated disk, CVA, loop recorder for ?Afib, colonic polyp, elevated cholesterol, prior GERD, s/p hiatal hernia surgery, severe persistent asthma,  presenting to the office today for follow up pulmonary evaluation. His chief complaint is\par -he notes feeling generally well\par -he notes allergy Sx (congested nose all day, sneezing, possibly wheezing)\par -he notes HA intermittently\par -he notes chest tightness\par -he notes that he wants to lose 15 lbs\par -he notes his bowels are regular \par -he notes that he is sleeping well when he uses his mouth piece (6-6.5 hours)\par -he note he is more rested when sleeping with a mouth piece\par -he notes HA in the morning if he sleeps without a mouth piece\par -he notes feeling constricted after he eats\par -he notes more nasal congestion after eating\par -he notes being on Nucala which may be giving a slight improvement\par -he notes exercising (wants to do more cardio and weight lifting)\par \par -patient denies any headaches, nausea, vomiting, fever, chills, sweats, palpitations, coughing, fatigue, diarrhea, constipation, dysphagia, myalgias, dizziness, leg swelling, leg pain, itchy eyes, itchy ears

## 2022-09-22 NOTE — ASSESSMENT
[FreeTextEntry1] : Mr. MCKEON is a 52 year old male with a history of  herniated disk, CVA, loop recorder for ?Afib, colonic polyp, elevated cholesterol, prior GERD, s/p hiatal hernia surgery, childhood asthma, severe persistent asthma, CVA 5/1/2021 - PFO Dx - awaiting Rx who comes into the office today for pulmonary evaluation for SOB CVA 5/1/2021 - PFO Dx - s/p closure - complicated by Afib - treated OSAS (DD) , allergies / severe persistent / eosinophilic asthma - poorly controlled - s/pCOVID-19 (symptomatic from 7/18/2022) - allergy Sx daily\par \par The patient's shortness of breath is multifactorial due to:\par -pulmonary disease \par      - Asthma \par      - (+)HEATHER\par -poor breathing mechanics \par -little bit overweight/out of shape\par -?cardiac disease - Dr. Baig ?Afib \par - GERD / LPR\par \par Problem 1: active Asthma (moderate to severe) -semi controlled\par -Add Singulair 10 mg QHS \par -continue Bevespi 2 inhalations BID\par -continue Alvesco (160) 2 inhalations BID or Qvar Redihaler 80 2 puffs BID\par -continue Ventolin rescue inhaler 2 inhalations before exercise, Q6H (PRN)\par -s/p Prednisone 20mg for 7 days then 10mg for 7 days (4/2022)\par -Information sheet given to the patient to be reviewed, this medication is never to be used without consulting the prescribing physician. Proper dietary restraint is necessary specifically salt containing foods, if any reaction may occur should be reported. \par -Asthma is believed to be caused by inherited (genetic) and environmental factor, but its exact cause is unknown. Asthma may be triggered by allergens, lung infections, or irritants in the air. Asthma triggers are different for each person\par \par Problem 1A: Eosinophilic Asthma \par -on Nucala since 7/2022\par - candidate for Fasenra or Nucala 100 q month (initiate) Eosinophil level 200 (4/28/2022)\par -The safety and efficacy of Nucala was established in three double-blind, randomized, placebo-controlled trials in patients with severe asthma. Compared to a placebo, patients with severe asthma receiving Nucala had fewer exacerbation requiring hospitalization and/or emergency department visits, and a longer time to first exacerbation.  In addition, patients with severe asthma receiving Nucala or Fasenra experienced greater reductions in their daily maintenance oral corticosteroid dose, while maintaining asthma control compared with patients receiving placebo. Treatment with Nucala did not result in a significant improvement in lung function, as measured by the volume of air exhaled by patients in one second. The most common side effects include: headache, injection site reactions, back pain, weakness, and fatigue; hypersensitivity reactions can occur within hours or days including swelling of the face, mouth, and tongue, fainting, dizziness, hives, breathing problems, and rash; herpes zoster infections have occurred. The drug is a monoclonal antibody that inhibits interleukin-5 which helps regular eosinophils, a type of white blood cell that contributes to asthma. The over-production of eosinophils can cause inflammation in the lungs, increasing the frequency of asthma attacks. Patients must also take other medications, including high dose inhaled corticosteroids and at least one additional asthma drug \par \par Problem 1B: Symptomatic COVID-19 infection 7/2022(symptomatic since 7/18/2022)\par -Paxovid unable due to Eloquis\par -add Albuterol 0.83% via nebulizer, Q6H\par -Add budesonide 0.5 BID \par -recommended quarantine for 8-10 days\par -recommended Yessy-[place] Cold and Flu \par -recommended Fisherman's Friend lozenges and Slippery Elm Throat Kote Tea \par \par Problem 2: Allergy / Sinus (active)\par - add Clarinex 5 mg QAM or equivalent\par -transition Qnasl 1 sniff BID to Dymista 1 sniff BID \par -transition Olopatadine 0.6% at 1 sniff/nostril BID (2nd) to Dymista 1 sniff BID \par -s/p blood work to include: IgE level, eosinophil level(+), vitamin D level(low), food IgE level(-), and asthma profile(+)\par Environmental measures for allergies were encouraged including mattress and pillow cover, air purifier, and environmental controls.\par \par Problem 2A: Low vitamin D \par - on rx \par Low vitamin D levels have been associated with asthma exacerbations and increased allergic symptoms. The goal based on recent information is maintaining levels between 50-70 and low normal is 30. Recommended 50,000 units every two weeks to once a month depending on the level. \par \par Problem 3: GERD / LPR -  s/p Nissen fundoplication \par -continue Pepcid 40 mg QHS\par -Rule of 2s: avoid eating too much, eating too fast, eating too late, eating too spicy, eating too lousy, eating two hours before bed.\par -Things to avoid including overeating, spicy foods, tight clothing, eating within three hours of bed, this list is not all inclusive. \par -For treatment of reflux, possible options discussed including diet control, H2 blockers, PPIs, as well as coating motility agents discussed as treatment options. Timing of meals and proximity of last meal to sleep were discussed. If symptoms persist, a formal gastrointestinal evaluation is needed.\par \par \par Problem 4: (+)HEATHER(risk: neck size, chronic reflux, poor sleep)\par - Suggested Oral Appliance for sleep apnea (Danoff - dental device) \par - f/p SS \par Sleep apnea is associated with adverse clinical consequences which an affect most organ systems. Cardiovascular disease risk includes arrhythmias, atrial fibrillation, hypertension, coronary artery disease, and stroke. Metabolic disorders include diabetes type 2, non-alcoholic fatty liver disease. Mood disorder especially depression; and cognitive decline especially in the elderly. Associations with chronic reflux/Byrne’s esophagus some but not all inclusive. \par -Reasons include arousal consistent with hypopnea; respiratory events most prominent in REM sleep or supine position; therefore sleep staging and body position are important for accurate diagnosis and estimation of AHI.\par \par Problem 5 : Poor Mechanics of Breathing \par -Recommended Wim Hof and Buteyko breathing techniques \par - Proper breathing techniques were reviewed with an emphasis of exhalation. Patient instructed to breath in for 1 second and out for four seconds. Patient was encouraged to not talk while walking. \par \par Problem 6 : Overweight\par -Recommended Margarito Celaya's 10-day detox diet and book. \par -Recommend "Muniq" OTC Supplement \par -Weight loss, exercise, and diet control were discussed and are highly encouraged. Treatment options were given such as, aqua therapy, and contacting a nutritionist. Recommended to use the elliptical, stationary bike, less use of treadmill. Mindful eating was explained to the patient Obesity is associated with worsening asthma, shortness of breath, and potential for cardiac disease, diabetes, and other underlying medical conditions. \par \par Problem 7 : Cardiac (PFO) \par -recommended to follow up with a cardiologist King/Divya - s/p PFO closure - complicated by AFib and now on Eliquis\par - recommended to follow up with vascular and neurologist \par \par Problem 8 : health maintenance\par - covid-19 vaccine x3 \par - Recommended Throat Coat Tea (Slippery Elm) \par -s/p influenza vaccine 2021 \par -recommended strep pneumonia vaccines after age 65: Prevnar-13 vaccine, followed by Pneumo vaccine 23 one year following\par -recommended early intervention for URIs\par -recommended regular osteoporosis evaluations\par -recommended early dermatological evaluations\par -recommended after the age of 50 to the age of 70, colonoscopy every 5 years \par \par  Follow up in 6-8 weeks\par -he  is recommended to call with any changes, questions, or concerns.

## 2022-10-12 ENCOUNTER — TRANSCRIPTION ENCOUNTER (OUTPATIENT)
Age: 52
End: 2022-10-12

## 2022-10-27 ENCOUNTER — APPOINTMENT (OUTPATIENT)
Dept: GASTROENTEROLOGY | Facility: CLINIC | Age: 52
End: 2022-10-27

## 2022-10-27 VITALS
DIASTOLIC BLOOD PRESSURE: 70 MMHG | HEIGHT: 74 IN | HEART RATE: 65 BPM | TEMPERATURE: 97.5 F | SYSTOLIC BLOOD PRESSURE: 108 MMHG | OXYGEN SATURATION: 95 % | RESPIRATION RATE: 17 BRPM | BODY MASS INDEX: 28.62 KG/M2 | WEIGHT: 223 LBS

## 2022-10-27 PROCEDURE — 99214 OFFICE O/P EST MOD 30 MIN: CPT

## 2022-10-27 RX ORDER — EPINEPHRINE 0.3 MG/.3ML
0.3 INJECTION INTRAMUSCULAR
Qty: 2 | Refills: 1 | Status: COMPLETED | COMMUNITY
Start: 2022-06-09 | End: 2022-10-27

## 2022-10-27 RX ORDER — MEPOLIZUMAB 100 MG/ML
100 INJECTION, SOLUTION SUBCUTANEOUS
Qty: 1 | Refills: 11 | Status: COMPLETED | COMMUNITY
Start: 2022-06-10 | End: 2022-10-27

## 2022-10-27 NOTE — ASSESSMENT
[FreeTextEntry1] : May impression is that of a male with a history of adenomatous polyps and a family history of colon cancer due for a surveillance colonoscopy now with worsening reflux.\par \par Trial of famotidine.\par \par Pt wishes to start with fecal DNA.\par \par After conferring with Neuro I have asked the patient to schedule an endoscopy and  colonoscopy when cleared to hold eliquis. I have reviewed the risks benefits and alternatives and provided the patient literature to read.  I have emphasized the need to have a good clean out including adequate fluid intake and avoiding seeds for one week prior to the procedure.

## 2022-10-27 NOTE — HISTORY OF PRESENT ILLNESS
[de-identified] : 2017 [FreeTextEntry1] : 2017 [de-identified] : Since last visit pt had two cva's and is on plavix... undergoing w/u.\par \par The patient still moves bowels daily. Denies any abdominal pain, constipation, diarrhea, bright red blood per rectum. Weight is up 10 lbs.\par \par No more heartburn, odynophagia, dysphagia or early satiety.\par \par No history of anemia or abnormal liver enzymes.\par \par Due for screening colonoscopy given personal history of polyps and family history of colon cancer.

## 2022-11-15 DIAGNOSIS — R19.5 OTHER FECAL ABNORMALITIES: ICD-10-CM

## 2022-11-17 PROBLEM — R19.5 POSITIVE COLORECTAL CANCER SCREENING USING COLOGUARD TEST: Status: ACTIVE | Noted: 2022-11-17

## 2022-11-17 RX ORDER — SODIUM SULFATE, MAGNESIUM SULFATE, AND POTASSIUM CHLORIDE 17.75; 2.7; 2.25 G/1; G/1; G/1
1479-225-188 TABLET ORAL
Qty: 1 | Refills: 0 | Status: ACTIVE | COMMUNITY
Start: 2022-11-17 | End: 1900-01-01

## 2022-12-05 ENCOUNTER — RESULT REVIEW (OUTPATIENT)
Age: 52
End: 2022-12-05

## 2022-12-05 ENCOUNTER — OUTPATIENT (OUTPATIENT)
Dept: OUTPATIENT SERVICES | Facility: HOSPITAL | Age: 52
LOS: 1 days | End: 2022-12-05
Payer: COMMERCIAL

## 2022-12-05 ENCOUNTER — APPOINTMENT (OUTPATIENT)
Dept: GASTROENTEROLOGY | Facility: HOSPITAL | Age: 52
End: 2022-12-05

## 2022-12-05 DIAGNOSIS — Z91.89 OTHER SPECIFIED PERSONAL RISK FACTORS, NOT ELSEWHERE CLASSIFIED: ICD-10-CM

## 2022-12-05 DIAGNOSIS — K21.9 GASTRO-ESOPHAGEAL REFLUX DISEASE WITHOUT ESOPHAGITIS: ICD-10-CM

## 2022-12-05 DIAGNOSIS — Z87.19 PERSONAL HISTORY OF OTHER DISEASES OF THE DIGESTIVE SYSTEM: Chronic | ICD-10-CM

## 2022-12-05 PROCEDURE — 45380 COLONOSCOPY AND BIOPSY: CPT | Mod: XS,PT

## 2022-12-05 PROCEDURE — 88312 SPECIAL STAINS GROUP 1: CPT

## 2022-12-05 PROCEDURE — 88305 TISSUE EXAM BY PATHOLOGIST: CPT | Mod: 26

## 2022-12-05 PROCEDURE — 43239 EGD BIOPSY SINGLE/MULTIPLE: CPT | Mod: 59

## 2022-12-05 PROCEDURE — 88305 TISSUE EXAM BY PATHOLOGIST: CPT

## 2022-12-05 PROCEDURE — 45385 COLONOSCOPY W/LESION REMOVAL: CPT | Mod: PT

## 2022-12-05 PROCEDURE — 45385 COLONOSCOPY W/LESION REMOVAL: CPT

## 2022-12-05 PROCEDURE — 43239 EGD BIOPSY SINGLE/MULTIPLE: CPT

## 2022-12-05 PROCEDURE — 45380 COLONOSCOPY AND BIOPSY: CPT | Mod: 59

## 2022-12-05 PROCEDURE — 88312 SPECIAL STAINS GROUP 1: CPT | Mod: 26

## 2022-12-05 RX ORDER — SODIUM CHLORIDE 9 MG/ML
500 INJECTION INTRAMUSCULAR; INTRAVENOUS; SUBCUTANEOUS
Refills: 0 | Status: COMPLETED | OUTPATIENT
Start: 2022-12-05 | End: 2022-12-05

## 2022-12-05 RX ADMIN — SODIUM CHLORIDE 75 MILLILITER(S): 9 INJECTION INTRAMUSCULAR; INTRAVENOUS; SUBCUTANEOUS at 09:48

## 2022-12-07 LAB — SURGICAL PATHOLOGY STUDY: SIGNIFICANT CHANGE UP

## 2023-01-04 ENCOUNTER — NON-APPOINTMENT (OUTPATIENT)
Age: 53
End: 2023-01-04

## 2023-01-10 LAB — SARS-COV-2 N GENE NPH QL NAA+PROBE: NOT DETECTED

## 2023-01-13 ENCOUNTER — OUTPATIENT (OUTPATIENT)
Dept: OUTPATIENT SERVICES | Facility: HOSPITAL | Age: 53
LOS: 1 days | End: 2023-01-13
Payer: COMMERCIAL

## 2023-01-13 ENCOUNTER — TRANSCRIPTION ENCOUNTER (OUTPATIENT)
Age: 53
End: 2023-01-13

## 2023-01-13 VITALS
RESPIRATION RATE: 16 BRPM | SYSTOLIC BLOOD PRESSURE: 114 MMHG | DIASTOLIC BLOOD PRESSURE: 67 MMHG | HEART RATE: 67 BPM | OXYGEN SATURATION: 98 %

## 2023-01-13 VITALS
WEIGHT: 210.1 LBS | HEART RATE: 68 BPM | DIASTOLIC BLOOD PRESSURE: 68 MMHG | HEIGHT: 74 IN | OXYGEN SATURATION: 99 % | SYSTOLIC BLOOD PRESSURE: 119 MMHG | RESPIRATION RATE: 16 BRPM | TEMPERATURE: 100 F

## 2023-01-13 DIAGNOSIS — Z87.19 PERSONAL HISTORY OF OTHER DISEASES OF THE DIGESTIVE SYSTEM: Chronic | ICD-10-CM

## 2023-01-13 DIAGNOSIS — I63.9 CEREBRAL INFARCTION, UNSPECIFIED: ICD-10-CM

## 2023-01-13 PROCEDURE — 33286 RMVL SUBQ CAR RHYTHM MNTR: CPT | Mod: 59

## 2023-01-13 PROCEDURE — 93005 ELECTROCARDIOGRAM TRACING: CPT

## 2023-01-13 PROCEDURE — 93010 ELECTROCARDIOGRAM REPORT: CPT

## 2023-01-13 PROCEDURE — C1764: CPT

## 2023-01-13 PROCEDURE — 99223 1ST HOSP IP/OBS HIGH 75: CPT | Mod: 25

## 2023-01-13 PROCEDURE — 33285 INSJ SUBQ CAR RHYTHM MNTR: CPT

## 2023-01-13 RX ORDER — ASPIRIN/CALCIUM CARB/MAGNESIUM 324 MG
1 TABLET ORAL
Qty: 0 | Refills: 0 | DISCHARGE

## 2023-01-13 RX ORDER — APIXABAN 2.5 MG/1
1 TABLET, FILM COATED ORAL
Qty: 0 | Refills: 0 | DISCHARGE

## 2023-01-13 NOTE — ASU PATIENT PROFILE, ADULT - CAREGIVER
"              After Visit Summary   3/21/2017    Aleyda Nicole    MRN: [id number]           Patient Information     Date Of Birth          1986        Visit Information        Provider Department      3/21/2017 1:40 PM Carmen Sky PA-C [facility]        Today's Diagnoses     Acute bronchospasm    -  1    Upper respiratory tract infection, unspecified type          Care Instructions    Saline- rinse            Follow-ups after your visit        Who to contact     If you have questions or need follow up information about today's clinic visit or your schedule please contact [facility] directly at [phone].  Normal or non-critical lab and imaging results will be communicated to you by MyChart, letter or phone within 4 business days after the clinic has received the results. If you do not hear from us within 7 days, please contact the clinic through MyChart or phone. If you have a critical or abnormal lab result, we will notify you by phone as soon as possible.  Submit refill requests through [employer] or call your pharmacy and they will forward the refill request to us. Please allow 3 business days for your refill to be completed.          Additional Information About Your Visit        MyChart Information     [employer] lets you send messages to your doctor, view your test results, renew your prescriptions, schedule appointments and more. To sign up, go to [email or web address] . Click on \"Log in\" on the left side of the screen, which will take you to the Welcome page. Then click on \"Sign up Now\" on the right side of the page.     You will be asked to enter the access code listed below, as well as some personal information. Please follow the directions to create your username and password.     Your access code is: [email or web address]  Expires: 2017  3:52 PM     Your access code will  in 90 days. If you need help or a new code, please call your " "[facility] or [phone].        Care EveryWhere ID     This is your Care EveryWhere ID. This could be used by other organizations to access your [place] medical records  [id number]        Your Vitals Were     Pulse Temperature Height Last Period Pulse Oximetry BMI (Body Mass Index)    91 98.4  F (36.9  C) (Oral) 5' 1.5\" (1.562 m) 04/11/2016 (Exact Date) 94% 30.1 kg/m2       Blood Pressure from Last 3 Encounters:   03/21/17 120/82   03/02/17 102/60   02/23/17 102/76    Weight from Last 3 Encounters:   03/21/17 161 lb 14.4 oz (73.4 kg)   03/02/17 157 lb (71.2 kg)   02/23/17 154 lb (69.9 kg)              Today, you had the following     No orders found for display         Today's Medication Changes          These changes are accurate as of: 3/21/17  2:04 PM.  If you have any questions, ask your nurse or doctor.               Start taking these medicines.        Dose/Directions    albuterol 108 (90 BASE) MCG/ACT Inhaler   Commonly known as:  PROAIR HFA/PROVENTIL HFA/VENTOLIN HFA   Used for:  Acute bronchospasm, Upper respiratory tract infection, unspecified type   Started by:  Carmen Sky PA-C        Dose:  2 puff   Inhale 2 puffs into the lungs every 4 hours as needed for shortness of breath / dyspnea or wheezing   Quantity:  1 Inhaler   Refills:  0            Where to get your medicines      These medications were sent to [facility] [address] [address]     Phone:  [phone]     albuterol 108 (90 BASE) MCG/ACT Inhaler                Primary Care Provider Office Phone # Fax #    Evan Zamora [phone]       [facility] [address] [address]        Thank you!     Thank you for choosing [facility]  for your care. Our goal is always to provide you with excellent care. Hearing back from our patients is one way we can continue to improve our " services. Please take a few minutes to complete the written survey that you may receive in the mail after your visit with us. Thank you!             Your Updated Medication List - Protect others around you: Learn how to safely use, store and throw away your medicines at www.disposemymeds.org.          This list is accurate as of: 3/21/17  2:04 PM.  Always use your most recent med list.                   Brand Name Dispense Instructions for use    albuterol 108 (90 BASE) MCG/ACT Inhaler    PROAIR HFA/PROVENTIL HFA/VENTOLIN HFA    1 Inhaler    Inhale 2 puffs into the lungs every 4 hours as needed for shortness of breath / dyspnea or wheezing       levothyroxine 137 MCG tablet    SYNTHROID/LEVOTHROID    90 tablet    Take 1 tablet (137 mcg) by mouth daily       prenatal multivitamin  plus iron 27-0.8 MG Tabs per tablet      Take 1 tablet by mouth daily          Declines

## 2023-01-13 NOTE — ASU DISCHARGE PLAN (ADULT/PEDIATRIC) - PROVIDER TOKENS
FREE:[LAST:[ELECTROPHYSIOLOGY CLINIC],FIRST:[FOR WOUND CHECK],PHONE:[phone]],FAX:[(   )    -],ADDRESS:[Cardiology department   first [address]],SCHEDULEDAPPT:[01/19/2023],SCHEDULEDAPPTTIME:[08:40 AM],ESTABLISHEDPATIENT:[T]]

## 2023-01-13 NOTE — H&P CARDIOLOGY - NS ATTEND OPT1 GEN_ALL_CORE
I attest my time as attending is greater than 50% of the total combined time spent on qualifying patient care activities by the PA/NP and attending.
Patient

## 2023-01-13 NOTE — H&P CARDIOLOGY - NS ATTEND AMEND GEN_ALL_CORE FT
seen and agree  patient with CVA and AF  S/P ILR now at [facility]. Plan for removal and reimplant for longterm arrhythmia management

## 2023-01-13 NOTE — ASU DISCHARGE PLAN (ADULT/PEDIATRIC) - NS MD DC FALL RISK RISK
For information on Fall & Injury Prevention, visit: [email or web address] OR  [email or web address] OR  https://www.cdc.gov/steadi/patient.html

## 2023-01-13 NOTE — ASU DISCHARGE PLAN (ADULT/PEDIATRIC) - CARE PROVIDER_API CALL
ELECTROPHYSIOLOGY CLINIC, FOR WOUND CHECK  Cardiology department   first floor   [address] [facility]  Phone: [phone]  Fax: (   )    -  Established Patient  Scheduled Appointment: 01/19/2023 08:40 AM

## 2023-01-13 NOTE — H&P CARDIOLOGY - HISTORY OF PRESENT ILLNESS
H and P dated 12/27/22  Allergies reviewed  Medications reconciled   PE unchanged      Pt presents today for loop explant and loop re implant.

## 2023-01-19 ENCOUNTER — APPOINTMENT (OUTPATIENT)
Dept: PULMONOLOGY | Facility: CLINIC | Age: 53
End: 2023-01-19
Payer: COMMERCIAL

## 2023-01-19 ENCOUNTER — NON-APPOINTMENT (OUTPATIENT)
Age: 53
End: 2023-01-19

## 2023-01-19 ENCOUNTER — TRANSCRIPTION ENCOUNTER (OUTPATIENT)
Age: 53
End: 2023-01-19

## 2023-01-19 VITALS
BODY MASS INDEX: 27.08 KG/M2 | OXYGEN SATURATION: 98 % | RESPIRATION RATE: 17 BRPM | TEMPERATURE: 97.1 F | SYSTOLIC BLOOD PRESSURE: 120 MMHG | HEIGHT: 74 IN | DIASTOLIC BLOOD PRESSURE: 78 MMHG | HEART RATE: 65 BPM | WEIGHT: 211 LBS

## 2023-01-19 DIAGNOSIS — U07.1 COVID-19: ICD-10-CM

## 2023-01-19 PROCEDURE — 99214 OFFICE O/P EST MOD 30 MIN: CPT | Mod: 25

## 2023-01-19 PROCEDURE — 95012 NITRIC OXIDE EXP GAS DETER: CPT

## 2023-01-19 PROCEDURE — 94010 BREATHING CAPACITY TEST: CPT

## 2023-01-19 RX ORDER — FLUTICASONE PROPIONATE 93 UG/1
93 SPRAY, METERED NASAL
Qty: 3 | Refills: 1 | Status: ACTIVE | COMMUNITY
Start: 2023-01-19 | End: 1900-01-01

## 2023-01-19 NOTE — PROCEDURE
[FreeTextEntry1] : PFT revealed normal flows, with a FEV1 of 5.35L, which is 123% of predicted, with a normal flow volume loop \par \par Feno was 123; a normal value being less than 25. Fractional exhaled nitric oxide (FENO) is regarded as a simple, noninvasive method for assessing eosinophilic airway inflammation. Produced by a variety of cells within the lung, nitric oxide (NO) concentrations are generally low in healthy individuals. However, high concentrations of NO appear to be involved in nonspecific host defense mechanisms and chronic inflammatory  diseases such as asthma. The American Thoracic Society (ATS) therefore recommended using FENO to aid in the diagnosis and monitoring of eosinophilic airway inflammation and asthma, and for identifying steroid responsive individuals whose chronic respiratory symptoms may be caused by airway inflammation

## 2023-01-19 NOTE — ASSESSMENT
[FreeTextEntry1] : Mr. MCKEON is a 52 year old male with a history of  herniated disk, CVA, loop recorder for ?Afib, colonic polyp, elevated cholesterol, prior GERD, s/p hiatal hernia surgery, childhood asthma, severe persistent asthma, CVA 5/1/2021 - PFO Dx - awaiting Rx who comes into the office today for pulmonary evaluation for SOB CVA 5/1/2021 - PFO Dx - s/p closure - complicated by Afib - treated OSAS (DD) , allergies / severe persistent / eosinophilic asthma - poorly controlled - s/pCOVID-19 (symptomatic from 7/18/2022) - allergy Sx improved\par \par The patient's shortness of breath is multifactorial due to:\par -pulmonary disease \par      - Asthma \par      - (+)HEATHER\par -poor breathing mechanics \par -little bit overweight/out of shape\par -?cardiac disease - Dr. Baig ?Afib \par - GERD / LPR\par \par Problem 1: active Asthma (moderate to severe) -improved\par -Add Singulair 10 mg QHS \par -continue Bevespi 2 inhalations BID\par -continue Alvesco (160) 2 inhalations BID or Qvar Redihaler 80 2 puffs BID\par -continue Ventolin rescue inhaler 2 inhalations before exercise, Q6H (PRN)\par -s/p Prednisone 20mg for 7 days then 10mg for 7 days (4/2022)\par -Information sheet given to the patient to be reviewed, this medication is never to be used without consulting the prescribing physician. Proper dietary restraint is necessary specifically salt containing foods, if any reaction may occur should be reported. \par -Asthma is believed to be caused by inherited (genetic) and environmental factor, but its exact cause is unknown. Asthma may be triggered by allergens, lung infections, or irritants in the air. Asthma triggers are different for each person\par \par Problem 1A: Eosinophilic Asthma \par -on Nucala since 7/2022\par - candidate for Fasenra or Nucala 100 q month (initiate) Eosinophil level 200 (4/28/2022)\par -The safety and efficacy of Nucala was established in three double-blind, randomized, placebo-controlled trials in patients with severe asthma. Compared to a placebo, patients with severe asthma receiving Nucala had fewer exacerbation requiring hospitalization and/or emergency department visits, and a longer time to first exacerbation.  In addition, patients with severe asthma receiving Nucala or Fasenra experienced greater reductions in their daily maintenance oral corticosteroid dose, while maintaining asthma control compared with patients receiving placebo. Treatment with Nucala did not result in a significant improvement in lung function, as measured by the volume of air exhaled by patients in one second. The most common side effects include: headache, injection site reactions, back pain, weakness, and fatigue; hypersensitivity reactions can occur within hours or days including swelling of the face, mouth, and tongue, fainting, dizziness, hives, breathing problems, and rash; herpes zoster infections have occurred. The drug is a monoclonal antibody that inhibits interleukin-5 which helps regular eosinophils, a type of white blood cell that contributes to asthma. The over-production of eosinophils can cause inflammation in the lungs, increasing the frequency of asthma attacks. Patients must also take other medications, including high dose inhaled corticosteroids and at least one additional asthma drug \par \par Problem 1B: Symptomatic COVID-19 infection 7/2022(symptomatic since 7/18/2022)\par -Paxovid unable due to Eliquis\par -add Albuterol 0.83% via nebulizer, Q6H\par -Add budesonide 0.5 BID \par -recommended quarantine for 8-10 days\par -recommended Yessy-Osceola Cold and Flu \par -recommended Fisherman's Friend lozenges and Slippery Elm Throat Kote Tea \par \par Problem 2: Allergy / Sinus (active)\par - add Clarinex 5 mg QAM or equivalent\par -Add Xhance 1 sniff BID (failed Qnasl and Olopatidine)\par -s/p blood work to include: IgE level, eosinophil level(+), vitamin D level(low), food IgE level(-), and asthma profile(+)\par Environmental measures for allergies were encouraged including mattress and pillow cover, air purifier, and environmental controls.\par \par Problem 2A: Low vitamin D \par - on rx \par Low vitamin D levels have been associated with asthma exacerbations and increased allergic symptoms. The goal based on recent information is maintaining levels between 50-70 and low normal is 30. Recommended 50,000 units every two weeks to once a month depending on the level. \par \par Problem 3: GERD / LPR -  s/p Nissen fundoplication \par -continue Pepcid 40 mg QHS\par -Rule of 2s: avoid eating too much, eating too fast, eating too late, eating too spicy, eating too lousy, eating two hours before bed.\par -Things to avoid including overeating, spicy foods, tight clothing, eating within three hours of bed, this list is not all inclusive. \par -For treatment of reflux, possible options discussed including diet control, H2 blockers, PPIs, as well as coating motility agents discussed as treatment options. Timing of meals and proximity of last meal to sleep were discussed. If symptoms persist, a formal gastrointestinal evaluation is needed.\par \par \par Problem 4: (+)HEATHER(risk: neck size, chronic reflux, poor sleep)\par - Suggested Oral Appliance for sleep apnea (Danoff - dental device) \par - f/p SS \par Sleep apnea is associated with adverse clinical consequences which an affect most organ systems. Cardiovascular disease risk includes arrhythmias, atrial fibrillation, hypertension, coronary artery disease, and stroke. Metabolic disorders include diabetes type 2, non-alcoholic fatty liver disease. Mood disorder especially depression; and cognitive decline especially in the elderly. Associations with chronic reflux/Byrne’s esophagus some but not all inclusive. \par -Reasons include arousal consistent with hypopnea; respiratory events most prominent in REM sleep or supine position; therefore sleep staging and body position are important for accurate diagnosis and estimation of AHI.\par \par Problem 5 : Poor Mechanics of Breathing \par -Recommended Wim Hof and Buteyko breathing techniques \par - Proper breathing techniques were reviewed with an emphasis of exhalation. Patient instructed to breath in for 1 second and out for four seconds. Patient was encouraged to not talk while walking. \par \par Problem 6 : Overweight\par -Recommended Margarito Celaya's 10-day detox diet and book. \par -Recommend "Muniq" OTC Supplement \par -Weight loss, exercise, and diet control were discussed and are highly encouraged. Treatment options were given such as, aqua therapy, and contacting a nutritionist. Recommended to use the elliptical, stationary bike, less use of treadmill. Mindful eating was explained to the patient Obesity is associated with worsening asthma, shortness of breath, and potential for cardiac disease, diabetes, and other underlying medical conditions. \par \par Problem 7 : Cardiac (PFO) \par -recommended to follow up with a cardiologist King/Divya - s/p PFO closure - complicated by AFib and now on Eliquis\par - recommended to follow up with vascular and neurologist \par \par Problem 8 : health maintenance\par -add choline supplements\par -recommended Sanotize anti viral nasal spray in case of viral infection \par - covid-19 vaccine x3 \par - Recommended Throat Coat Tea (Slippery Elm) \par -s/p influenza vaccine 2022\par -recommended strep pneumonia vaccines after age 65: Prevnar-13 vaccine, followed by Pneumo vaccine 23 one year following\par -recommended early intervention for URIs\par -recommended regular osteoporosis evaluations\par -recommended early dermatological evaluations\par -recommended after the age of 50 to the age of 70, colonoscopy every 5 years \par \par  Follow up in 6-8 weeks\par -he  is recommended to call with any changes, questions, or concerns.

## 2023-01-19 NOTE — HISTORY OF PRESENT ILLNESS
[TextBox_4] : Mr. MCKEON is a 52 year old male hx of herniated disk, CVA, loop recorder for ?Afib, colonic polyp, elevated cholesterol, prior GERD, s/p hiatal hernia surgery, severe persistent asthma,  presenting to the office today for follow up pulmonary evaluation. His chief complaint is\par \par -he notes that he has lost weight due to diet (lost 15 lbs)\par -he notes Rajesh has been helping him\par -he notes his sinuses are non-problematic at the moment but he notes sinus congestion at times when waking up\par -he notes chest tightness at times for which he uses an inhaler\par -he notes exercising (walking, joining a gym)\par -he notes his energy levels are good\par -he notes wearing his mouth piece and is sleeping better\par -he notes that he is still only getting 6 hours of sleep\par -he notes that his sleep is uninterrupted \par -he notes a headache in the back of his head when he does not wear his mouth piece but denies headaches when he wears his mouth piece\par -he notes taking psyllium husks\par -he notes his memory and concentration are good \par \par -patient denies any nausea, vomiting, fever, chills, sweats, palpitations, coughing, wheezing, diarrhea, constipation, dysphagia, myalgias, dizziness, leg swelling, leg pain, itchy eyes, itchy ears, heartburn, reflux or sour taste in the mouth

## 2023-01-19 NOTE — ADDENDUM
[FreeTextEntry1] : Documented by Portillo Kim acting as a scribe for Dr. Demario Tai on 01/19/2023.\par \par All medical record entries made by the Scribe were at my, Dr. Demario Tai's, direction and personally dictated by me on 01/19/2023. I have reviewed the chart and agree that the record accurately reflects my personal performance of the history, physical exam, assessment and plan. I have also personally directed, reviewed, and agree with the discharge instructions.

## 2023-02-02 ENCOUNTER — NON-APPOINTMENT (OUTPATIENT)
Age: 53
End: 2023-02-02

## 2023-02-02 ENCOUNTER — APPOINTMENT (OUTPATIENT)
Dept: ELECTROPHYSIOLOGY | Facility: CLINIC | Age: 53
End: 2023-02-02
Payer: COMMERCIAL

## 2023-02-02 VITALS
WEIGHT: 211 LBS | SYSTOLIC BLOOD PRESSURE: 117 MMHG | HEART RATE: 61 BPM | HEIGHT: 74 IN | BODY MASS INDEX: 27.08 KG/M2 | DIASTOLIC BLOOD PRESSURE: 75 MMHG | OXYGEN SATURATION: 95 %

## 2023-02-02 PROCEDURE — 93291 INTERROG DEV EVAL SCRMS IP: CPT

## 2023-02-02 PROCEDURE — 93000 ELECTROCARDIOGRAM COMPLETE: CPT | Mod: 59

## 2023-02-02 PROCEDURE — 99212 OFFICE O/P EST SF 10 MIN: CPT

## 2023-02-13 ENCOUNTER — APPOINTMENT (OUTPATIENT)
Dept: UROLOGY | Facility: CLINIC | Age: 53
End: 2023-02-13
Payer: COMMERCIAL

## 2023-02-13 VITALS
DIASTOLIC BLOOD PRESSURE: 68 MMHG | TEMPERATURE: 97.6 F | HEART RATE: 78 BPM | OXYGEN SATURATION: 96 % | SYSTOLIC BLOOD PRESSURE: 105 MMHG | RESPIRATION RATE: 17 BRPM

## 2023-02-13 PROCEDURE — 99214 OFFICE O/P EST MOD 30 MIN: CPT

## 2023-02-13 NOTE — HISTORY OF PRESENT ILLNESS
[FreeTextEntry1] : The patient was seen and examined with SHANIA Muse in the room. \par \par This patient is here for the follow-up of low sex drive.  Concern for possible low testosterone.  And erectile dysfunction.\par \par He has been taking 5 mg of Cialis daily.  It has helped him with erection and sex drive.\par \par He still believes that he has low testosterone.  The testosterone check previously was within normal limit but low normal.\par \par We will repeat his testosterone today.\par \par

## 2023-02-13 NOTE — ASSESSMENT
[FreeTextEntry1] : Low T - ? will repeat \par \par discussed that sex drive is multifactorial and T is only one of modulator of sex drive \par \par if his repeated T is normal then \par \par he can follow up with me in 1 year \par otherwise we will discuss treatment \par \par The submitted E/M billing level for this visit reflects the total time spent on the day of the visit including documentation in EMR, face-to-face time spent with the patient, non-face-to-face review of medical records and relevant information, review of laboratory results available via [employer] portal, as well using a patient’s electronic medical records portal for patients with records not available to [facility] directly. \par \par Time spend counseling and performing coordination of care was also included in determining the appropriate EM billing level.\par \par I have reviewed and verified information regarding the chief complaint and history recorded by the ancillary staff and/or the patient. I have independently reviewed and interpreted tests performed by other physicians and facilities as necessary. I have reviewed images pertinent to providing the care to  the patient. \par \par Notes from other physicians were reviewed. \par \par \par I have reviewed with the patient previously ordered laboratory tests, discussed changes in levels, their meaning and consequences.\par \par I have discussed with the patient differential diagnosis, reason for auxiliary tests if ordered, risks, benefits, alternatives, and complications of each form of therapy included surgical therapy. Off label use of most of medications used in andrology was reviewed. \par \par Additional labs and imaging studies were ordered. \par \par All questions were answered. \par \par \par \par

## 2023-02-22 LAB
BASOPHILS # BLD AUTO: 0.05 K/UL
BASOPHILS NFR BLD AUTO: 0.8 %
EOSINOPHIL # BLD AUTO: 0.07 K/UL
EOSINOPHIL NFR BLD AUTO: 1.2 %
ESTRADIOL SERPL-MCNC: 35 PG/ML
FSH SERPL-MCNC: 2.3 IU/L
HCT VFR BLD CALC: 46.6 %
HGB BLD-MCNC: 15.8 G/DL
IMM GRANULOCYTES NFR BLD AUTO: 0.3 %
LH SERPL-ACNC: 5 IU/L
LYMPHOCYTES # BLD AUTO: 1.78 K/UL
LYMPHOCYTES NFR BLD AUTO: 30.1 %
MAN DIFF?: NORMAL
MCHC RBC-ENTMCNC: 30.7 PG
MCHC RBC-ENTMCNC: 33.9 GM/DL
MCV RBC AUTO: 90.7 FL
MONOCYTES # BLD AUTO: 0.55 K/UL
MONOCYTES NFR BLD AUTO: 9.3 %
NEUTROPHILS # BLD AUTO: 3.44 K/UL
NEUTROPHILS NFR BLD AUTO: 58.3 %
PLATELET # BLD AUTO: 260 K/UL
PROLACTIN SERPL-MCNC: 13.7 NG/ML
PSA FREE FLD-MCNC: 13 %
PSA FREE SERPL-MCNC: 0.22 NG/ML
PSA SERPL-MCNC: 1.67 NG/ML
RBC # BLD: 5.14 M/UL
RBC # FLD: 12.5 %
TESTOST FREE SERPL-MCNC: 7.9 PG/ML
TESTOST SERPL-MCNC: 293 NG/DL
WBC # FLD AUTO: 5.91 K/UL

## 2023-02-23 ENCOUNTER — TRANSCRIPTION ENCOUNTER (OUTPATIENT)
Age: 53
End: 2023-02-23

## 2023-02-23 LAB
TESTOST FREE SERPL-MCNC: 6.1 PG/ML
TESTOST SERPL-MCNC: 299 NG/DL

## 2023-03-03 ENCOUNTER — APPOINTMENT (OUTPATIENT)
Dept: GASTROENTEROLOGY | Facility: CLINIC | Age: 53
End: 2023-03-03

## 2023-03-07 ENCOUNTER — APPOINTMENT (OUTPATIENT)
Dept: GASTROENTEROLOGY | Facility: CLINIC | Age: 53
End: 2023-03-07
Payer: COMMERCIAL

## 2023-03-07 DIAGNOSIS — Z91.89 OTHER SPECIFIED PERSONAL RISK FACTORS, NOT ELSEWHERE CLASSIFIED: ICD-10-CM

## 2023-03-07 PROCEDURE — 99214 OFFICE O/P EST MOD 30 MIN: CPT | Mod: 95

## 2023-03-22 ENCOUNTER — TRANSCRIPTION ENCOUNTER (OUTPATIENT)
Age: 53
End: 2023-03-22

## 2023-03-22 LAB — LYNCH/COLORECTAL HIGH RISK PANEL: NEGATIVE

## 2023-05-02 RX ORDER — DESLORATADINE 5 MG/1
5 TABLET ORAL DAILY
Qty: 90 | Refills: 1 | Status: ACTIVE | COMMUNITY
Start: 2022-04-01 | End: 1900-01-01

## 2023-05-19 ENCOUNTER — RX RENEWAL (OUTPATIENT)
Age: 53
End: 2023-05-19

## 2023-05-19 ENCOUNTER — NON-APPOINTMENT (OUTPATIENT)
Age: 53
End: 2023-05-19

## 2023-05-19 ENCOUNTER — APPOINTMENT (OUTPATIENT)
Dept: PULMONOLOGY | Facility: CLINIC | Age: 53
End: 2023-05-19
Payer: COMMERCIAL

## 2023-05-19 VITALS
OXYGEN SATURATION: 98 % | HEART RATE: 62 BPM | SYSTOLIC BLOOD PRESSURE: 122 MMHG | BODY MASS INDEX: 27.34 KG/M2 | RESPIRATION RATE: 16 BRPM | TEMPERATURE: 97.3 F | WEIGHT: 213 LBS | HEIGHT: 74 IN | DIASTOLIC BLOOD PRESSURE: 64 MMHG

## 2023-05-19 PROCEDURE — 95012 NITRIC OXIDE EXP GAS DETER: CPT

## 2023-05-19 PROCEDURE — 99214 OFFICE O/P EST MOD 30 MIN: CPT | Mod: 25

## 2023-05-19 PROCEDURE — 94010 BREATHING CAPACITY TEST: CPT

## 2023-05-19 RX ORDER — FAMOTIDINE 40 MG/1
40 TABLET, FILM COATED ORAL
Qty: 90 | Refills: 4 | Status: ACTIVE | COMMUNITY
Start: 2022-10-27 | End: 1900-01-01

## 2023-05-19 RX ORDER — DESLORATADINE 5 MG/1
5 TABLET ORAL DAILY
Qty: 90 | Refills: 1 | Status: ACTIVE | COMMUNITY
Start: 2022-09-22 | End: 1900-01-01

## 2023-05-19 NOTE — ADDENDUM
[FreeTextEntry1] : Documented by DASH Barillas acting as a scribe for Dr. Demario Tai on 05/19/2023 .\par \par All medical record entries made by the Scribe were at my, Dr. Demario Tai's, direction and personally dictated by me on 05/19/2023. I have reviewed the chart and agree that the record accurately reflects my personal performance of the history, physical exam, assessment and plan. I have also personally directed, reviewed, and agree with the discharge instructions.

## 2023-05-19 NOTE — PHYSICAL EXAM
[No Acute Distress] : no acute distress [Normal Oropharynx] : normal oropharynx [II] : Mallampati Class: II [Normal Appearance] : normal appearance [No Neck Mass] : no neck mass [Normal Rate/Rhythm] : normal rate/rhythm [Normal S1, S2] : normal s1, s2 [No Murmurs] : no murmurs [Clear to Auscultation Bilaterally] : clear to auscultation bilaterally [No Resp Distress] : no resp distress [No Abnormalities] : no abnormalities [Benign] : benign [Normal Gait] : normal gait [No Clubbing] : no clubbing [No Cyanosis] : no cyanosis [No Edema] : no edema [FROM] : FROM [Normal Color/ Pigmentation] : normal color/ pigmentation [No Focal Deficits] : no focal deficits [Normal Affect] : normal affect [Oriented x3] : oriented x3 [TextBox_68] : I:E 1:3; Clear

## 2023-05-19 NOTE — ASSESSMENT
[FreeTextEntry1] : Mr. MCKEON is a 53 year old male with a history of  herniated disk, CVA, loop recorder for ?Afib, colonic polyp, elevated cholesterol, prior GERD, s/p hiatal hernia surgery, childhood asthma, severe persistent asthma, CVA 5/1/2021 - PFO Dx - awaiting Rx who comes into the office today for pulmonary evaluation for SOB CVA 5/1/2021 - PFO Dx - s/p closure - complicated by Afib - treated OSAS (DD) , allergies / severe persistent / eosinophilic asthma - poorly controlled - s/pCOVID-19 (symptomatic from 7/18/2022) - allergy Sx improved- mild nasal congestion\par \par The patient's shortness of breath is multifactorial due to:\par -pulmonary disease \par      - Asthma \par      - (+)HEATHER\par -poor breathing mechanics \par -little bit overweight/out of shape\par -?cardiac disease - Dr. Baig ?Afib \par - GERD / LPR\par \par Problem 1: active Asthma (moderate to severe) -improved (controlled)\par -Add Singulair 10 mg QHS \par -continue Bevespi 2 inhalations BID\par -continue Alvesco (160) 2 inhalations BID or Qvar Redihaler 80 2 puffs BID\par -continue Ventolin rescue inhaler 2 inhalations before exercise, Q6H (PRN)\par -s/p Prednisone 20mg for 7 days then 10mg for 7 days (4/2022)\par -Information sheet given to the patient to be reviewed, this medication is never to be used without consulting the prescribing physician. Proper dietary restraint is necessary specifically salt containing foods, if any reaction may occur should be reported. \par -Asthma is believed to be caused by inherited (genetic) and environmental factor, but its exact cause is unknown. Asthma may be triggered by allergens, lung infections, or irritants in the air. Asthma triggers are different for each person\par \par Problem 1A: Eosinophilic Asthma \par -on Nucala since 7/2022\par - candidate for Fasenra or Nucala 100 q month (initiate) Eosinophil level 200 (4/28/2022)\par -The safety and efficacy of Nucala was established in three double-blind, randomized, placebo-controlled trials in patients with severe asthma. Compared to a placebo, patients with severe asthma receiving Nucala had fewer exacerbation requiring hospitalization and/or emergency department visits, and a longer time to first exacerbation.  In addition, patients with severe asthma receiving Nucala or Fasenra experienced greater reductions in their daily maintenance oral corticosteroid dose, while maintaining asthma control compared with patients receiving placebo. Treatment with Nucala did not result in a significant improvement in lung function, as measured by the volume of air exhaled by patients in one second. The most common side effects include: headache, injection site reactions, back pain, weakness, and fatigue; hypersensitivity reactions can occur within hours or days including swelling of the face, mouth, and tongue, fainting, dizziness, hives, breathing problems, and rash; herpes zoster infections have occurred. The drug is a monoclonal antibody that inhibits interleukin-5 which helps regular eosinophils, a type of white blood cell that contributes to asthma. The over-production of eosinophils can cause inflammation in the lungs, increasing the frequency of asthma attacks. Patients must also take other medications, including high dose inhaled corticosteroids and at least one additional asthma drug \par \par Problem 1B: Symptomatic COVID-19 infection 7/2022(symptomatic since 7/18/2022)- resolved\par -Paxovid unable due to Eliquis\par -add Albuterol 0.83% via nebulizer, Q6H\par -Add budesonide 0.5 BID \par -recommended quarantine for 8-10 days\par -recommended Yessy-Blackburn Cold and Flu \par -recommended Fisherman's Friend lozenges and Slippery Elm Throat Kote Tea \par \par Problem 2: Allergy / Sinus (semi-active)\par - add Clarinex 5 mg QAM or equivalent\par -add Allegra 180 mg QHS \par -Add Xhance 1 sniff BID (failed Qnasl and Olopatidine)\par -s/p blood work to include: IgE level, eosinophil level(+), vitamin D level(low), food IgE level(-), and asthma profile(+)\par Environmental measures for allergies were encouraged including mattress and pillow cover, air purifier, and environmental controls.\par \par Problem 2A: Low vitamin D \par - on rx \par Low vitamin D levels have been associated with asthma exacerbations and increased allergic symptoms. The goal based on recent information is maintaining levels between 50-70 and low normal is 30. Recommended 50,000 units every two weeks to once a month depending on the level. \par \par Problem 3: GERD / LPR -  s/p Nissen fundoplication \par -continue Pepcid 40 mg QHS\par -Rule of 2s: avoid eating too much, eating too fast, eating too late, eating too spicy, eating too lousy, eating two hours before bed.\par -Things to avoid including overeating, spicy foods, tight clothing, eating within three hours of bed, this list is not all inclusive. \par -For treatment of reflux, possible options discussed including diet control, H2 blockers, PPIs, as well as coating motility agents discussed as treatment options. Timing of meals and proximity of last meal to sleep were discussed. If symptoms persist, a formal gastrointestinal evaluation is needed.\par \par \par Problem 4: (+)HEATHER(risk: neck size, chronic reflux, poor sleep)\par - Suggested Oral Appliance for sleep apnea (Danoff - dental device) \par - f/p SS \par Sleep apnea is associated with adverse clinical consequences which an affect most organ systems. Cardiovascular disease risk includes arrhythmias, atrial fibrillation, hypertension, coronary artery disease, and stroke. Metabolic disorders include diabetes type 2, non-alcoholic fatty liver disease. Mood disorder especially depression; and cognitive decline especially in the elderly. Associations with chronic reflux/Byrne’s esophagus some but not all inclusive. \par -Reasons include arousal consistent with hypopnea; respiratory events most prominent in REM sleep or supine position; therefore sleep staging and body position are important for accurate diagnosis and estimation of AHI.\par \par Problem 5 : Poor Mechanics of Breathing \par -Recommended Wim Hof and B[place]yko breathing techniques \par - Proper breathing techniques were reviewed with an emphasis of exhalation. Patient instructed to breath in for 1 second and out for four seconds. Patient was encouraged to not talk while walking. \par \par Problem 6 : Overweight- improved\par -Recommended Margarito Celaya's 10-day detox diet and book. \par -Recommend "Muniq" OTC Supplement \par -Weight loss, exercise, and diet control were discussed and are highly encouraged. Treatment options were given such as, aqua therapy, and contacting a nutritionist. Recommended to use the elliptical, stationary bike, less use of treadmill. Mindful eating was explained to the patient Obesity is associated with worsening asthma, shortness of breath, and potential for cardiac disease, diabetes, and other underlying medical conditions. \par \par Problem 7 : Cardiac (PFO) \par -recommended to follow up with a cardiologist King/Divya - s/p PFO closure - complicated by AFib and now on Eliquis\par - recommended to follow up with vascular and neurologist \par \par Problem 8 : health maintenance\par -add choline supplements\par -recommended Sanotize anti viral nasal spray in case of viral infection \par - covid-19 vaccine x3 \par - Recommended Throat Coat Tea (Slippery Elm) \par -s/p influenza vaccine 2022\par -recommended strep pneumonia vaccines after age 65: Prevnar-13 vaccine, followed by Pneumo vaccine 23 one year following\par -recommended early intervention for URIs\par -recommended regular osteoporosis evaluations\par -recommended early dermatological evaluations\par -recommended after the age of 50 to the age of 70, colonoscopy every 5 years \par \par  Follow up in 6-8 weeks\par -he  is recommended to call with any changes, questions, or concerns.

## 2023-06-12 ENCOUNTER — TRANSCRIPTION ENCOUNTER (OUTPATIENT)
Age: 53
End: 2023-06-12

## 2023-06-12 PROBLEM — Z91.89 HIGH RISK FOR COLON CANCER: Status: ACTIVE | Noted: 2021-05-20

## 2023-07-06 ENCOUNTER — NON-APPOINTMENT (OUTPATIENT)
Age: 53
End: 2023-07-06

## 2023-07-06 ENCOUNTER — APPOINTMENT (OUTPATIENT)
Dept: ORTHOPEDIC SURGERY | Facility: CLINIC | Age: 53
End: 2023-07-06
Payer: COMMERCIAL

## 2023-07-06 VITALS — SYSTOLIC BLOOD PRESSURE: 108 MMHG | HEART RATE: 68 BPM | DIASTOLIC BLOOD PRESSURE: 69 MMHG

## 2023-07-06 DIAGNOSIS — M25.569 PAIN IN UNSPECIFIED KNEE: ICD-10-CM

## 2023-07-06 DIAGNOSIS — M17.11 UNILATERAL PRIMARY OSTEOARTHRITIS, RIGHT KNEE: ICD-10-CM

## 2023-07-06 PROCEDURE — 73564 X-RAY EXAM KNEE 4 OR MORE: CPT | Mod: RT

## 2023-07-06 PROCEDURE — 99214 OFFICE O/P EST MOD 30 MIN: CPT

## 2023-07-25 ENCOUNTER — APPOINTMENT (OUTPATIENT)
Dept: ORTHOPEDIC SURGERY | Facility: CLINIC | Age: 53
End: 2023-07-25
Payer: COMMERCIAL

## 2023-07-25 VITALS — SYSTOLIC BLOOD PRESSURE: 117 MMHG | HEART RATE: 67 BPM | DIASTOLIC BLOOD PRESSURE: 72 MMHG

## 2023-07-25 DIAGNOSIS — M23.91 UNSPECIFIED INTERNAL DERANGEMENT OF RIGHT KNEE: ICD-10-CM

## 2023-07-25 DIAGNOSIS — S83.249A OTHER TEAR OF MEDIAL MENISCUS, CURRENT INJURY, UNSPECIFIED KNEE, INITIAL ENCOUNTER: ICD-10-CM

## 2023-07-25 PROCEDURE — 99214 OFFICE O/P EST MOD 30 MIN: CPT

## 2023-08-14 ENCOUNTER — TRANSCRIPTION ENCOUNTER (OUTPATIENT)
Age: 53
End: 2023-08-14

## 2023-09-15 ENCOUNTER — APPOINTMENT (OUTPATIENT)
Dept: PULMONOLOGY | Facility: CLINIC | Age: 53
End: 2023-09-15
Payer: COMMERCIAL

## 2023-09-15 VITALS
TEMPERATURE: 97.3 F | HEIGHT: 74 IN | HEART RATE: 72 BPM | OXYGEN SATURATION: 98 % | RESPIRATION RATE: 16 BRPM | WEIGHT: 211 LBS | BODY MASS INDEX: 27.08 KG/M2 | DIASTOLIC BLOOD PRESSURE: 72 MMHG | SYSTOLIC BLOOD PRESSURE: 116 MMHG

## 2023-09-15 DIAGNOSIS — M54.9 DORSALGIA, UNSPECIFIED: ICD-10-CM

## 2023-09-15 DIAGNOSIS — J82.83 EOSINOPHILIC ASTHMA: ICD-10-CM

## 2023-09-15 PROCEDURE — 95012 NITRIC OXIDE EXP GAS DETER: CPT

## 2023-09-15 PROCEDURE — 94010 BREATHING CAPACITY TEST: CPT

## 2023-09-15 PROCEDURE — 99214 OFFICE O/P EST MOD 30 MIN: CPT | Mod: 25

## 2023-09-15 RX ORDER — LEVOCETIRIZINE DIHYDROCHLORIDE 5 MG/1
5 TABLET ORAL
Qty: 1 | Refills: 1 | Status: ACTIVE | COMMUNITY
Start: 2023-09-15 | End: 1900-01-01

## 2023-09-19 ENCOUNTER — APPOINTMENT (OUTPATIENT)
Dept: UROLOGY | Facility: CLINIC | Age: 53
End: 2023-09-19
Payer: COMMERCIAL

## 2023-09-19 DIAGNOSIS — E29.1 TESTICULAR HYPOFUNCTION: ICD-10-CM

## 2023-09-19 PROCEDURE — 99214 OFFICE O/P EST MOD 30 MIN: CPT

## 2023-09-26 ENCOUNTER — APPOINTMENT (OUTPATIENT)
Dept: UROLOGY | Facility: CLINIC | Age: 53
End: 2023-09-26

## 2023-10-02 LAB
HCT VFR BLD CALC: 43.2 %
HGB BLD-MCNC: 15 G/DL
MCHC RBC-ENTMCNC: 31.2 PG
MCHC RBC-ENTMCNC: 34.7 GM/DL
MCV RBC AUTO: 89.8 FL
PLATELET # BLD AUTO: 250 K/UL
PSA SERPL-MCNC: 1.38 NG/ML
RBC # BLD: 4.81 M/UL
RBC # FLD: 12.3 %
TESTOST SERPL-MCNC: 486 NG/DL
WBC # FLD AUTO: 5.27 K/UL

## 2023-10-02 RX ORDER — TESTOSTERONE 20.25 MG/1.25G
1.62 GEL, METERED TRANSDERMAL
Qty: 2 | Refills: 0 | Status: DISCONTINUED | COMMUNITY
Start: 2023-02-23 | End: 2023-10-02

## 2023-12-06 ENCOUNTER — RX RENEWAL (OUTPATIENT)
Age: 53
End: 2023-12-06

## 2023-12-06 RX ORDER — MEPOLIZUMAB 100 MG/ML
100 INJECTION, SOLUTION SUBCUTANEOUS
Qty: 1 | Refills: 11 | Status: ACTIVE | COMMUNITY
Start: 2022-12-27 | End: 1900-01-01

## 2023-12-25 ENCOUNTER — NON-APPOINTMENT (OUTPATIENT)
Age: 53
End: 2023-12-25

## 2024-01-02 ENCOUNTER — TRANSCRIPTION ENCOUNTER (OUTPATIENT)
Age: 54
End: 2024-01-02

## 2024-01-02 ENCOUNTER — APPOINTMENT (OUTPATIENT)
Dept: UROLOGY | Facility: CLINIC | Age: 54
End: 2024-01-02
Payer: COMMERCIAL

## 2024-01-02 VITALS
DIASTOLIC BLOOD PRESSURE: 74 MMHG | HEIGHT: 74 IN | HEART RATE: 64 BPM | SYSTOLIC BLOOD PRESSURE: 107 MMHG | OXYGEN SATURATION: 95 %

## 2024-01-02 DIAGNOSIS — R68.82 DECREASED LIBIDO: ICD-10-CM

## 2024-01-02 DIAGNOSIS — N52.9 MALE ERECTILE DYSFUNCTION, UNSPECIFIED: ICD-10-CM

## 2024-01-02 DIAGNOSIS — Z12.5 ENCOUNTER FOR SCREENING FOR MALIGNANT NEOPLASM OF PROSTATE: ICD-10-CM

## 2024-01-02 PROCEDURE — 99214 OFFICE O/P EST MOD 30 MIN: CPT

## 2024-01-02 NOTE — ASSESSMENT
[FreeTextEntry1] : 53-year-old male with low libido, ED  #Low libido - T wnl - d/w patient -not candidate for TRT given normal levels  #ED - Continue daily cialis  #PSA screening - Last PSA 1.38. AURE wnl  RPA 1 year

## 2024-01-02 NOTE — HISTORY OF PRESENT ILLNESS
[FreeTextEntry1] : 53-year-old male who presents for follow-up.  Previously followed with Dr. Lind for low libido, ED  T 2/2023 - 299 PSA 2/2023 - 1.67  T 10/2023 - 486 PSA 10/2023 - 1.38  Urination - OK. No gross hematuria, dysuria  Erections are OK on cialis. Libido is down. NOT on TRT due to normal value

## 2024-01-02 NOTE — PHYSICAL EXAM
[Normal Appearance] : normal appearance [Edema] : no peripheral edema [Exaggerated Use Of Accessory Muscles For Inspiration] : no accessory muscle use [Bowel Sounds] : normal bowel sounds

## 2024-01-03 ENCOUNTER — TRANSCRIPTION ENCOUNTER (OUTPATIENT)
Age: 54
End: 2024-01-03

## 2024-01-04 ENCOUNTER — TRANSCRIPTION ENCOUNTER (OUTPATIENT)
Age: 54
End: 2024-01-04

## 2024-01-04 LAB
RAPID RVP RESULT: NOT DETECTED
SARS-COV-2 RNA PNL RESP NAA+PROBE: NOT DETECTED

## 2024-01-04 RX ORDER — AZITHROMYCIN 500 MG/1
500 TABLET, FILM COATED ORAL DAILY
Qty: 7 | Refills: 0 | Status: ACTIVE | COMMUNITY
Start: 2024-01-04 | End: 1900-01-01

## 2024-01-05 ENCOUNTER — TRANSCRIPTION ENCOUNTER (OUTPATIENT)
Age: 54
End: 2024-01-05

## 2024-03-18 ENCOUNTER — APPOINTMENT (OUTPATIENT)
Dept: PULMONOLOGY | Facility: CLINIC | Age: 54
End: 2024-03-18
Payer: COMMERCIAL

## 2024-03-18 VITALS
WEIGHT: 210 LBS | RESPIRATION RATE: 16 BRPM | OXYGEN SATURATION: 98 % | HEIGHT: 74 IN | BODY MASS INDEX: 26.95 KG/M2 | HEART RATE: 73 BPM | TEMPERATURE: 96.8 F | DIASTOLIC BLOOD PRESSURE: 72 MMHG | SYSTOLIC BLOOD PRESSURE: 112 MMHG

## 2024-03-18 DIAGNOSIS — G47.33 OBSTRUCTIVE SLEEP APNEA (ADULT) (PEDIATRIC): ICD-10-CM

## 2024-03-18 DIAGNOSIS — J45.50 SEVERE PERSISTENT ASTHMA, UNCOMPLICATED: ICD-10-CM

## 2024-03-18 DIAGNOSIS — R06.02 SHORTNESS OF BREATH: ICD-10-CM

## 2024-03-18 DIAGNOSIS — K21.9 GASTRO-ESOPHAGEAL REFLUX DISEASE W/OUT ESOPHAGITIS: ICD-10-CM

## 2024-03-18 DIAGNOSIS — R06.83 SNORING: ICD-10-CM

## 2024-03-18 DIAGNOSIS — J30.9 ALLERGIC RHINITIS, UNSPECIFIED: ICD-10-CM

## 2024-03-18 PROCEDURE — 99214 OFFICE O/P EST MOD 30 MIN: CPT | Mod: 25

## 2024-03-18 PROCEDURE — 94010 BREATHING CAPACITY TEST: CPT

## 2024-03-18 PROCEDURE — 94727 GAS DIL/WSHOT DETER LNG VOL: CPT

## 2024-03-18 PROCEDURE — ZZZZZ: CPT

## 2024-03-18 PROCEDURE — 94729 DIFFUSING CAPACITY: CPT

## 2024-03-18 PROCEDURE — 95012 NITRIC OXIDE EXP GAS DETER: CPT

## 2024-03-18 RX ORDER — DESLORATADINE 5 MG/1
5 TABLET ORAL
Qty: 90 | Refills: 1 | Status: ACTIVE | COMMUNITY
Start: 2024-03-18 | End: 1900-01-01

## 2024-03-18 RX ORDER — AZELASTINE HYDROCHLORIDE AND FLUTICASONE PROPIONATE 137; 50 UG/1; UG/1
137-50 SPRAY, METERED NASAL
Qty: 1 | Refills: 3 | Status: ACTIVE | COMMUNITY
Start: 2024-03-18 | End: 1900-01-01

## 2024-03-18 RX ORDER — OLOPATADINE HYDROCHLORIDE AND MOMETASONE FUROATE 25; 665 UG/1; UG/1
665-25 SPRAY, METERED NASAL
Qty: 3 | Refills: 1 | Status: DISCONTINUED | COMMUNITY
Start: 2024-03-18 | End: 2024-03-18

## 2024-03-18 NOTE — PROCEDURE
[FreeTextEntry1] : Full PFT reveals normal flows; FEV1 was 3.40 L which is 78% of predicted; normal lung volumes; normal diffusion at 45.7, which is 163% of predicted; normal flow volume loop. PFTs were performed to evaluate for SOB..   FENO was 42; a normal value being less than 25 Fractional exhaled nitric oxide (FENO) is regarded as a simple, noninvasive method for assessing eosinophilic airway inflammation. Produced by a variety of cells within the lung, nitric oxide (NO) concentrations are generally low in healthy individuals. However, high concentrations of NO appear to be involved in nonspecific host defense mechanisms and chronic inflammatory diseases such as asthma. The American Thoracic Society (ATS) therefore has strongly recommended using FENO to aid in the assessment, management, and long-term monitoring of eosinophilic airway inflammation and asthma, and for identifying steroid responsive individuals whose chronic respiratory symptoms may be caused by airway inflammation. In their 2011 clinical practice guideline, the ATS emphasizes the importance of using FENO.

## 2024-03-18 NOTE — PHYSICAL EXAM
[Normal Oropharynx] : normal oropharynx [No Acute Distress] : no acute distress [II] : Mallampati Class: II [Normal Appearance] : normal appearance [Normal Rate/Rhythm] : normal rate/rhythm [No Neck Mass] : no neck mass [No Murmurs] : no murmurs [Normal S1, S2] : normal s1, s2 [No Resp Distress] : no resp distress [Clear to Auscultation Bilaterally] : clear to auscultation bilaterally [No Abnormalities] : no abnormalities [Benign] : benign [Normal Gait] : normal gait [No Clubbing] : no clubbing [No Cyanosis] : no cyanosis [No Edema] : no edema [FROM] : FROM [Normal Color/ Pigmentation] : normal color/ pigmentation [No Focal Deficits] : no focal deficits [Oriented x3] : oriented x3 [Normal Affect] : normal affect [TextBox_68] : I:E 1:3; Clear
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
## 2024-03-18 NOTE — ASSESSMENT
[FreeTextEntry1] : Mr. MCKEON is a 54 year old male with a history of  herniated disk, CVA, loop recorder for ?Afib, colonic polyp, elevated cholesterol, prior GERD, s/p hiatal hernia surgery, childhood asthma, severe persistent asthma, CVA 5/1/2021 - PFO Dx - awaiting Rx s/p CVA 5/1/2021 - PFO Dx - s/p closure - complicated by Afib - treated OSAS (DD) , allergies / severe persistent / eosinophilic asthma - poorly controlled - s/pCOVID-19 (symptomatic from 7/18/2022) - allergy Sx improved- mild nasal congestion ([place])  The patient's shortness of breath is multifactorial due to: -pulmonary disease       - Asthma       - (+)HEATHER -poor breathing mechanics  -little bit overweight/out of shape -?cardiac disease - Dr. Baig ?Afib  - GERD / LPR  Problem 1: Asthma (moderate to severe) -improved (controlled) -continue Singulair 10 mg QHS  -continue Bevespi 2 inhalations BID -continue Alvesco (160) 2 inhalations BID or Qvar Redihaler 80 2 puffs BID -continue Ventolin rescue inhaler 2 inhalations before exercise, Q6H (PRN) -s/p Prednisone 20mg for 7 days then 10mg for 7 days (4/2022) -Information sheet given to the patient to be reviewed, this medication is never to be used without consulting the prescribing physician. Proper dietary restraint is necessary specifically salt containing foods, if any reaction may occur should be reported.  -Asthma is believed to be caused by inherited (genetic) and environmental factor, but its exact cause is unknown. Asthma may be triggered by allergens, lung infections, or irritants in the air. Asthma triggers are different for each person  Problem 1A: Eosinophilic Asthma  -on Nucala since 7/2022 - candidate for Fasenra or Nucala 100 q month (initiate) Eosinophil level 200 (4/28/2022) -The safety and efficacy of Nucala was established in three double-blind, randomized, placebo-controlled trials in patients with severe asthma. Compared to a placebo, patients with severe asthma receiving Nucala had fewer exacerbation requiring hospitalization and/or emergency department visits, and a longer time to first exacerbation.  In addition, patients with severe asthma receiving Nucala or Fasenra experienced greater reductions in their daily maintenance oral corticosteroid dose, while maintaining asthma control compared with patients receiving placebo. Treatment with Nucala did not result in a significant improvement in lung function, as measured by the volume of air exhaled by patients in one second. The most common side effects include: headache, injection site reactions, back pain, weakness, and fatigue; hypersensitivity reactions can occur within hours or days including swelling of the face, mouth, and tongue, fainting, dizziness, hives, breathing problems, and rash; herpes zoster infections have occurred. The drug is a monoclonal antibody that inhibits interleukin-5 which helps regular eosinophils, a type of white blood cell that contributes to asthma. The over-production of eosinophils can cause inflammation in the lungs, increasing the frequency of asthma attacks. Patients must also take other medications, including high dose inhaled corticosteroids and at least one additional asthma drug   Problem 1B: Symptomatic COVID-19 infection 7/2022(symptomatic since 7/18/2022)- resolved -Paxovid unable due to Eliquis -s/p Albuterol 0.83% via nebulizer, Q6H -s/p budesonide 0.5 BID  -recommended quarantine for 8-10 days -recommended Yessy-Horatio Cold and Flu  -recommended Fisherman's Friend lozenges and Slippery Elm Throat Kote Tea   Problem 2: Allergy / Sinus (semi-active) - add Clarinex 5 mg QAM or equivalent -add Xyzal 5 mg QHS  -add Allegra 180 mg QHS  -s/p Xhance 1 sniff BID (failed Qnasl and Olopatidine) - move to Ryaltris 1 sniff BID -s/p blood work to include: IgE level, eosinophil level(+), vitamin D level(low), food IgE level(-), and asthma profile(+) Environmental measures for allergies were encouraged including mattress and pillow cover, air purifier, and environmental controls.  Problem 2A: Low vitamin D  - on rx  Low vitamin D levels have been associated with asthma exacerbations and increased allergic symptoms. The goal based on recent information is maintaining levels between 50-70 and low normal is 30. Recommended 50,000 units every two weeks to once a month depending on the level.   Problem 3: GERD / LPR -  s/p Nissen fundoplication  -continue Pepcid 40 mg QHS -Rule of 2s: avoid eating too much, eating too fast, eating too late, eating too spicy, eating too lousy, eating two hours before bed. -Things to avoid including overeating, spicy foods, tight clothing, eating within three hours of bed, this list is not all inclusive.  -For treatment of reflux, possible options discussed including diet control, H2 blockers, PPIs, as well as coating motility agents discussed as treatment options. Timing of meals and proximity of last meal to sleep were discussed. If symptoms persist, a formal gastrointestinal evaluation is needed.   Problem 4: (+)HEATHER(risk: neck size, chronic reflux, poor sleep) -discussed positional sleep  - Suggested Oral Appliance for sleep apnea (Danoff - dental device)  - f/p SS  Sleep apnea is associated with adverse clinical consequences which an affect most organ systems. Cardiovascular disease risk includes arrhythmias, atrial fibrillation, hypertension, coronary artery disease, and stroke. Metabolic disorders include diabetes type 2, non-alcoholic fatty liver disease. Mood disorder especially depression; and cognitive decline especially in the elderly. Associations with chronic reflux/Byrne's esophagus some but not all inclusive.  -Reasons include arousal consistent with hypopnea; respiratory events most prominent in REM sleep or supine position; therefore sleep staging and body position are important for accurate diagnosis and estimation of AHI.  Problem 5 : Poor Mechanics of Breathing  -Recommended Shweta Dillon and [place] breathing techniques  - Proper breathing techniques were reviewed with an emphasis of exhalation. Patient instructed to breath in for 1 second and out for four seconds. Patient was encouraged to not talk while walking.   Problem 6 : Overweight- improved -Recommended Margarito Celaya's 10-day detox diet and book.  -Recommend "Muniq" OTC Supplement  -Weight loss, exercise, and diet control were discussed and are highly encouraged. Treatment options were given such as, aqua therapy, and contacting a nutritionist. Recommended to use the elliptical, stationary bike, less use of treadmill. Mindful eating was explained to the patient Obesity is associated with worsening asthma, shortness of breath, and potential for cardiac disease, diabetes, and other underlying medical conditions.   Problem 7 : Cardiac (PFO)  -recommended to follow up with a cardiologist King/Divya - s/p PFO closure - complicated by AFib and now on Eliquis - recommended to follow up with vascular and neurologist   Problem 8 : health maintenance -add choline supplements -recommended Sanotize anti viral nasal spray in case of viral infection  - covid-19 vaccine x3  - Recommended Throat Coat Tea (Slippery Elm)  -s/p influenza vaccine 2023 -recommended strep pneumonia vaccines after age 65: Prevnar-13 vaccine, followed by Pneumo vaccine 23 one year following -recommended early intervention for URIs -recommended regular osteoporosis evaluations -recommended early dermatological evaluations -recommended after the age of 50 to the age of 70, colonoscopy every 5 years   F/P in 4-6 months  -He  is recommended to call with any changes, questions, or concerns.

## 2024-03-18 NOTE — ADDENDUM
[FreeTextEntry1] : Documented by Eusebio Gudino acting as a scribe for Dr. Demario Tai on 03/18/2024.   All medical record entries made by the Scribe were at my, Dr. Demario Tai's, direction and personally dictated by me on 03/18/2024. I have reviewed the chart and agree that the record accurately reflects my personal performance of the history, physical exam, assessment and plan. I have also personally directed, reviewed, and agree with the discharge instructions.

## 2024-04-09 ENCOUNTER — OUTPATIENT (OUTPATIENT)
Dept: OUTPATIENT SERVICES | Facility: HOSPITAL | Age: 54
LOS: 1 days | End: 2024-04-09

## 2024-04-09 VITALS
TEMPERATURE: 97 F | RESPIRATION RATE: 16 BRPM | SYSTOLIC BLOOD PRESSURE: 113 MMHG | OXYGEN SATURATION: 96 % | HEIGHT: 74 IN | HEART RATE: 64 BPM | DIASTOLIC BLOOD PRESSURE: 79 MMHG | WEIGHT: 216.05 LBS

## 2024-04-09 DIAGNOSIS — Q21.12 PATENT FORAMEN OVALE: Chronic | ICD-10-CM

## 2024-04-09 DIAGNOSIS — K42.9 UMBILICAL HERNIA WITHOUT OBSTRUCTION OR GANGRENE: ICD-10-CM

## 2024-04-09 DIAGNOSIS — Z87.19 PERSONAL HISTORY OF OTHER DISEASES OF THE DIGESTIVE SYSTEM: Chronic | ICD-10-CM

## 2024-04-09 DIAGNOSIS — Z95.818 PRESENCE OF OTHER CARDIAC IMPLANTS AND GRAFTS: Chronic | ICD-10-CM

## 2024-04-09 RX ORDER — MEPOLIZUMAB 100 MG/ML
1 INJECTION, SOLUTION SUBCUTANEOUS
Qty: 0 | Refills: 0 | DISCHARGE

## 2024-04-09 RX ORDER — ASPIRIN/CALCIUM CARB/MAGNESIUM 324 MG
1 TABLET ORAL
Qty: 0 | Refills: 0 | DISCHARGE

## 2024-04-09 RX ORDER — FINASTERIDE 5 MG/1
1 TABLET, FILM COATED ORAL
Qty: 0 | Refills: 0 | DISCHARGE

## 2024-04-09 RX ORDER — ALBUTEROL 90 UG/1
2 AEROSOL, METERED ORAL
Qty: 0 | Refills: 0 | DISCHARGE

## 2024-04-09 RX ORDER — ROSUVASTATIN CALCIUM 5 MG/1
1 TABLET ORAL
Qty: 0 | Refills: 0 | DISCHARGE

## 2024-04-09 RX ORDER — APIXABAN 2.5 MG/1
1 TABLET, FILM COATED ORAL
Qty: 0 | Refills: 0 | DISCHARGE

## 2024-04-09 RX ORDER — DESLORATADINE 5 MG/1
1 TABLET, FILM COATED ORAL
Qty: 0 | Refills: 0 | DISCHARGE

## 2024-04-09 RX ORDER — MONTELUKAST 4 MG/1
1 TABLET, CHEWABLE ORAL
Qty: 0 | Refills: 0 | DISCHARGE

## 2024-04-09 NOTE — H&P PST ADULT - NSICDXPASTSURGICALHX_GEN_ALL_CORE_FT
PAST SURGICAL HISTORY:  H/O hiatal hernia 2006 fundoplication    Implantable loop recorder present     PFO (patent foramen ovale)

## 2024-04-09 NOTE — H&P PST ADULT - HISTORY OF PRESENT ILLNESS
55 yo male with h/o HLD, CVA 2021, on Eliqius, presents to [facility] for preop evaluation for umbilical hernia repair. Patient reports history of umbilical hernia "many years", c/o discomfort and  pulling sensation in mid abdomen over the past 3 years, denies bulging. preop diagnosis umbilical hernia without obstruction or gangrene    patient reports was scheduled for surgery in 2021, but "i had recent stroke and my doctor didnt' let me get off Eliquis, i had to postpone the surgery" 53 yo male with h/o HLD, CVA 2021, on Eliqius, Aspirin, presents to [facility] for preop evaluation for umbilical hernia repair. Patient reports history of umbilical hernia "many years", c/o discomfort and  pulling sensation in mid abdomen over the past 3 years, denies bulging. preop diagnosis umbilical hernia without obstruction or gangrene    patient reports was scheduled for surgery in 2021, but "i had recent stroke and my doctor didnt' let me get off Eliquis, i had to postpone the surgery"

## 2024-04-09 NOTE — H&P PST ADULT - RESPIRATORY AND THORAX COMMENTS
h/o asthma diagnosed as a child, denies recent exacerbations, h/o hospitalization due to asthma "as an infant" h/o asthma diagnosed as a child, denies recent exacerbations, h/o hospitalization due to asthma "as an infant"; HEATHER

## 2024-04-09 NOTE — H&P PST ADULT - NSANTHOSAYNRD_GEN_A_CORE
No. HEATHER screening performed.  STOP BANG Legend: 0-2 = LOW Risk; 3-4 = INTERMEDIATE Risk; 5-8 = HIGH Risk Yes
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
## 2024-04-09 NOTE — H&P PST ADULT - COMMENTS
activity: walking 2 miles/day, ADL, house chores, weight lifting 2 x week, run short distance  METS: 7.68 (DASI)

## 2024-04-09 NOTE — H&P PST ADULT - NSICDXPASTMEDICALHX_GEN_ALL_CORE_FT
PAST MEDICAL HISTORY:  Asthma     CVA (cerebral vascular accident) on MRI    Fatty liver     Hiatal hernia     Hyperlipidemia     Ventral hernia without obstruction or gangrene      PAST MEDICAL HISTORY:  Asthma     CVA (cerebral vascular accident) on MRI    Fatty liver     Hiatal hernia     Hyperlipidemia     HEATHER (obstructive sleep apnea)     Ventral hernia without obstruction or gangrene

## 2024-04-09 NOTE — H&P PST ADULT - NEUROLOGICAL COMMENTS
[facility]; " I was on plavix at first, but had 1 episode of afib and they switched me to eliquis"

## 2024-04-09 NOTE — H&P PST ADULT - PROBLEM SELECTOR PLAN 1
pt scheduled for umbilical hernia repair on 04/18/24  Preop instructions provided. Pt verbalized understanding.   Pepcid for GI prophylaxis with written and verbal instruction provided    written and verbal instructions with teach back on chlorhexidine shampoo provided,  pt verbalized understanding   EKG from cardiologist from 01/2024 requested    h/o HEATHER, does not use CPAP, HEATHER precautions    h/o CVA on Eliquis, loop recorder in place, copy of the card faxed to OR booking, pt instructed by cardiologist to hold Eliquis 2 days preop, pt to follow cardiologist instructions    h/o Asthma, on montelukast, continue med use as prescribed

## 2024-04-17 ENCOUNTER — TRANSCRIPTION ENCOUNTER (OUTPATIENT)
Age: 54
End: 2024-04-17

## 2024-04-17 VITALS
OXYGEN SATURATION: 96 % | SYSTOLIC BLOOD PRESSURE: 117 MMHG | HEIGHT: 74 IN | RESPIRATION RATE: 20 BRPM | HEART RATE: 65 BPM | DIASTOLIC BLOOD PRESSURE: 77 MMHG | WEIGHT: 216.05 LBS | TEMPERATURE: 97 F

## 2024-04-18 ENCOUNTER — OUTPATIENT (OUTPATIENT)
Dept: OUTPATIENT SERVICES | Facility: HOSPITAL | Age: 54
LOS: 1 days | Discharge: HOME | End: 2024-04-18

## 2024-04-18 ENCOUNTER — TRANSCRIPTION ENCOUNTER (OUTPATIENT)
Age: 54
End: 2024-04-18

## 2024-04-18 VITALS
HEART RATE: 54 BPM | DIASTOLIC BLOOD PRESSURE: 69 MMHG | OXYGEN SATURATION: 97 % | SYSTOLIC BLOOD PRESSURE: 100 MMHG | RESPIRATION RATE: 16 BRPM | TEMPERATURE: 97 F

## 2024-04-18 DIAGNOSIS — Z87.19 PERSONAL HISTORY OF OTHER DISEASES OF THE DIGESTIVE SYSTEM: Chronic | ICD-10-CM

## 2024-04-18 DIAGNOSIS — Z95.818 PRESENCE OF OTHER CARDIAC IMPLANTS AND GRAFTS: Chronic | ICD-10-CM

## 2024-04-18 DIAGNOSIS — K42.9 UMBILICAL HERNIA WITHOUT OBSTRUCTION OR GANGRENE: ICD-10-CM

## 2024-04-18 DIAGNOSIS — Q21.12 PATENT FORAMEN OVALE: Chronic | ICD-10-CM

## 2024-04-18 RX ORDER — ERGOCALCIFEROL 1.25 MG/1
0 CAPSULE ORAL
Refills: 0 | DISCHARGE

## 2024-04-18 RX ORDER — PREGABALIN 225 MG/1
0 CAPSULE ORAL
Refills: 0 | DISCHARGE

## 2024-04-18 RX ORDER — TADALAFIL 10 MG/1
1 TABLET, FILM COATED ORAL
Refills: 0 | DISCHARGE

## 2024-04-18 RX ORDER — ASPIRIN/CALCIUM CARB/MAGNESIUM 324 MG
1 TABLET ORAL
Refills: 0 | DISCHARGE

## 2024-04-18 RX ORDER — OXYCODONE HYDROCHLORIDE 5 MG/1
1 TABLET ORAL
Qty: 12 | Refills: 0
Start: 2024-04-18 | End: 2024-04-20

## 2024-04-18 RX ORDER — APIXABAN 2.5 MG/1
1 TABLET, FILM COATED ORAL
Refills: 0 | DISCHARGE

## 2024-04-18 RX ORDER — ROSUVASTATIN CALCIUM 5 MG/1
1 TABLET ORAL
Refills: 0 | DISCHARGE

## 2024-04-18 RX ORDER — FINASTERIDE 5 MG/1
1 TABLET, FILM COATED ORAL
Refills: 0 | DISCHARGE

## 2024-04-18 RX ORDER — MONTELUKAST 4 MG/1
1 TABLET, CHEWABLE ORAL
Refills: 0 | DISCHARGE

## 2024-04-18 NOTE — ASU DISCHARGE PLAN (ADULT/PEDIATRIC) - NURSING INSTRUCTIONS
You were given IV Tylenol (1000mg) for pain management in the Operating Room.  Please do NOT take any additonal tylenol/acetaminophen products (Percocet, Vicodin, Excedrin) for the next 6-8 hours (after 2:00PM ). Please do not take tylenol AND percocet/vicodin/excedrin as narcotic prescription already contains tylenol.     You were given IV Toradol for pain management in the Operating Room. Please do NOT take Ibuprofen (NSAIDS) products (Motrin, Aleve, Advil) for the next 6-8 hours (After  2:30PM). Please take ibuprophen with food to avoid gastrointestinal issues.    When taking pain/narcotic medications - take with food as it can cause nausea if taken on an empty stomach, and know it may cause constipation. To prevent constipation increase fluids and fiber in diet. You may take stool softeners (such as Colace) and follow directions as printed on bottle. - Do NOT drive while on narcotics.

## 2024-04-18 NOTE — ASU DISCHARGE PLAN (ADULT/PEDIATRIC) - ASU DC SPECIAL INSTRUCTIONSFT
You just had umbilical hernia surgery. Please follow the instructions below to make your recovery as comfortable as possible.    **REST! Apply ice packs to incision sites 20 mins on, 20 mins off every 4 hours for the first 24 hours.    If taking narcotic pain medications, you may take COLACE (OTC stool softener) as directed to prevent constipation.**    1. Depending on the procedure, you may or may not have pain. Please fill any prescriptions you have been given and take as directed.    2. Remove outer dressing in 3 days before showering, leave white steri strips underneath in place.    3. You could experience minimal to mild blood staining of your dressing. If bleeding is persistent, but light, apply direct and gentle pressure to the area and lie flat in bed for 10-15 min. If bleeding is persistent and heavy or is associated with clots call the office immediately.    4. If you are unable to urinate after adequate hydration in 8-10 hours, call the office.    5. No lifting >20 lbs for 6-8 weeks     6. You may resume taking your blood thinner tomorrow     **Please call the office for an appointment when you get home ([phone]). If you have any questions, problems or concerns, do not hesitate to call the office.**

## 2024-04-18 NOTE — BRIEF OPERATIVE NOTE - OPERATION/FINDINGS
Primary repair of umbilical hernia approximally 1-0.5cm fascial defect found. Repaired primarily with Prolene.

## 2024-04-18 NOTE — ASU DISCHARGE PLAN (ADULT/PEDIATRIC) - CARE PROVIDER_API CALL
Bernardo Kirkpatrick  Surgery  [address], [address]  [place] [address]  Phone: [phone]  Fax: [phone]  Follow Up Time: 2 weeks

## 2024-04-18 NOTE — ASU DISCHARGE PLAN (ADULT/PEDIATRIC) - NS MD DC FALL RISK RISK
For information on Fall & Injury Prevention, visit: [email or web address] OR  [email or web address] OR  https://www.cdc.gov/steadi/patient.html

## 2024-05-17 RX ORDER — MONTELUKAST 10 MG/1
10 TABLET, FILM COATED ORAL
Qty: 1 | Refills: 1 | Status: ACTIVE | COMMUNITY
Start: 2022-09-22 | End: 1900-01-01

## 2024-06-04 NOTE — PROCEDURE
Caller: Ac Caldwell    Relationship: Self    Best call back number: [phone]     What orders are you requesting (i.e. lab or imaging): FULL PANEL LABS    In what timeframe would the patient need to come in: SOON    Where will you receive your lab/imaging services: [facility]     Additional notes: PATIENT SENT A MESSAGE LAST FRIDAY REQUESTING LABS.          [FreeTextEntry1] : PFT reveals mild restrictive dysfunction with an FEV1 of 3.33 L, which is 77% of predicted, with a normal flow volume loop.\par PFTs were performed to evaluate for asthma\par \par FENO was 148; a normal value being less than 25\par Fractional exhaled nitric oxide (FENO) is regarded as a simple, noninvasive method for assessing eosinophilic airway inflammation. Produced by a variety of cells within the lung, nitric oxide (NO) concentrations are generally low in healthy individuals. However, high concentrations of NO appear to be involved in nonspecific host defense mechanisms and chronic inflammatory diseases such as asthma. The American Thoracic Society (ATS) therefore has recommended using FENO to aid in the diagnosis and monitoring of eosinophilic airway inflammation and asthma, and for identifying steroid responsive individuals whose chronic respiratory symptoms may be caused by airway inflammation.

## 2024-06-17 ENCOUNTER — RX RENEWAL (OUTPATIENT)
Age: 54
End: 2024-06-17

## 2024-06-17 RX ORDER — TADALAFIL 5 MG/1
5 TABLET ORAL
Qty: 90 | Refills: 3 | Status: ACTIVE | COMMUNITY
Start: 2022-04-26 | End: 1900-01-01

## 2024-07-30 ENCOUNTER — APPOINTMENT (OUTPATIENT)
Dept: PULMONOLOGY | Facility: CLINIC | Age: 54
End: 2024-07-30
Payer: COMMERCIAL

## 2024-07-30 VITALS
HEART RATE: 69 BPM | OXYGEN SATURATION: 96 % | RESPIRATION RATE: 16 BRPM | TEMPERATURE: 97.2 F | WEIGHT: 218 LBS | SYSTOLIC BLOOD PRESSURE: 130 MMHG | BODY MASS INDEX: 27.98 KG/M2 | HEIGHT: 74 IN | DIASTOLIC BLOOD PRESSURE: 72 MMHG

## 2024-07-30 DIAGNOSIS — J45.50 SEVERE PERSISTENT ASTHMA, UNCOMPLICATED: ICD-10-CM

## 2024-07-30 DIAGNOSIS — R06.83 SNORING: ICD-10-CM

## 2024-07-30 DIAGNOSIS — J82.83 EOSINOPHILIC ASTHMA: ICD-10-CM

## 2024-07-30 DIAGNOSIS — K21.9 GASTRO-ESOPHAGEAL REFLUX DISEASE W/OUT ESOPHAGITIS: ICD-10-CM

## 2024-07-30 DIAGNOSIS — G47.33 OBSTRUCTIVE SLEEP APNEA (ADULT) (PEDIATRIC): ICD-10-CM

## 2024-07-30 DIAGNOSIS — R06.02 SHORTNESS OF BREATH: ICD-10-CM

## 2024-07-30 DIAGNOSIS — J30.9 ALLERGIC RHINITIS, UNSPECIFIED: ICD-10-CM

## 2024-07-30 PROBLEM — I48.91 UNSPECIFIED ATRIAL FIBRILLATION: Chronic | Status: ACTIVE | Noted: 2024-04-18

## 2024-07-30 PROCEDURE — 95012 NITRIC OXIDE EXP GAS DETER: CPT

## 2024-07-30 PROCEDURE — 94010 BREATHING CAPACITY TEST: CPT

## 2024-07-30 PROCEDURE — 99214 OFFICE O/P EST MOD 30 MIN: CPT | Mod: 25

## 2024-07-30 NOTE — REASON FOR VISIT
[Follow-Up] : a follow-up visit [TextBox_44] : SOB, (+)HEATHER, allergies, severe persistent asthma, GERD

## 2024-07-30 NOTE — HISTORY OF PRESENT ILLNESS
[TextBox_4] : Mr. MCKEON is a 54 year old male hx of herniated disk, CVA, loop recorder for ?Afib, colonic polyp, elevated cholesterol, prior GERD, s/p hiatal hernia surgery, severe persistent asthma,  presenting to the office today for follow-up pulmonary evaluation. His chief complaint is  -he notes feeling generally well -he notes gaining weight -he notes his allergy Sx are active lately -he notes some upper chest discomfort, he believes due to GERD -he notes exercising (walking, weightlifting) -he notes GERD is controlled with Famotidine; he notes his reflux is most active when he comes home from work for dinner -he notes he is a fast eater, but at work he typically eats smaller portions and healthier food -he notes his breathing is stable  -he notes his memory and concentration are stable  -he denies any headaches, nausea, emesis, fever, chills, sweats, chest pain, chest pressure, coughing, wheezing, palpitations, diarrhea, constipation, dysphagia, vertigo, arthralgias, myalgias, leg swelling, itchy eyes, itchy ears, or sour taste in the mouth.

## 2024-07-30 NOTE — PHYSICAL EXAM
[No Acute Distress] : no acute distress [Normal Oropharynx] : normal oropharynx [II] : Mallampati Class: II [No Neck Mass] : no neck mass [Normal Appearance] : normal appearance [Normal Rate/Rhythm] : normal rate/rhythm [Normal S1, S2] : normal s1, s2 [No Murmurs] : no murmurs [No Resp Distress] : no resp distress [Clear to Auscultation Bilaterally] : clear to auscultation bilaterally [No Abnormalities] : no abnormalities [Benign] : benign [Normal Gait] : normal gait [No Clubbing] : no clubbing [No Cyanosis] : no cyanosis [No Edema] : no edema [FROM] : FROM [Normal Color/ Pigmentation] : normal color/ pigmentation [No Focal Deficits] : no focal deficits [Oriented x3] : oriented x3 [Normal Affect] : normal affect [TextBox_68] : I:E 1:3; Clear

## 2024-07-30 NOTE — PROCEDURE
[FreeTextEntry1] : PFTs revealed normal flows; FEV1 was 4.38L, which is 135% of predicted; normal flow volume loop.   PFTs were performed to evaluate for SOB  FENO was 68; a normal value being less than 25 Fractional exhaled nitric oxide (FENO) is regarded as a simple, noninvasive method for assessing eosinophilic airway inflammation. Produced by a variety of cells within the lung, nitric oxide (NO) concentrations are generally low in healthy individuals. However, high concentrations of NO appear to be involved in nonspecific host defense mechanisms and chronic inflammatory diseases such as asthma. The American Thoracic Society (ATS) therefore has recommended using FENO to aid in the diagnosis and monitoring of eosinophilic airway inflammation and asthma, and for identifying steroid responsive individuals whose chronic respiratory symptoms may be caused by airway inflammation.

## 2024-07-30 NOTE — ADDENDUM
[FreeTextEntry1] : Documented by Kiel Joe acting as a scribe for Dr. Demario Tai on 07/30/2024. All medical record entries made by the Scribe were at my, Dr. Demario Tai's, direction and personally dictated by me on 07/30/2024. I have reviewed the chart and agree that the record accurately reflects my personal performance of the history, physical exam, assessment and plan. I have also personally directed, reviewed, and agree with the discharge instructions.

## 2024-07-30 NOTE — ASSESSMENT
[FreeTextEntry1] : Mr. MCKEON is a 54 year old male with a history of  herniated disk, CVA, loop recorder for ?Afib, colonic polyp, elevated cholesterol, prior GERD, s/p hiatal hernia surgery, childhood asthma, severe persistent asthma, CVA 5/1/2021 - PFO Dx - s/p Rx s/p CVA 5/1/2021 - PFO Dx - s/p closure - complicated by Afib - treated OSAS (DD) , allergies / severe persistent / eosinophilic asthma - poorly controlled - s/pCOVID-19 (symptomatic from 7/18/2022) - allergy Sx improved- reflux Sx self induced  The patient's shortness of breath is multifactorial due to: -pulmonary disease       - Asthma       - (+)HEATHER -poor breathing mechanics  -little bit overweight/out of shape -?cardiac disease - Dr. Baig ?Afib  - GERD / LPR  Problem 1: Asthma (moderate to severe) -improved (controlled) -continue Singulair 10 mg QHS  -continue Bevespi 2 inhalations BID -continue Alvesco (160) 2 inhalations BID or Qvar Redihaler 80 2 puffs BID -continue Ventolin rescue inhaler 2 inhalations before exercise, Q6H (PRN) -s/p Prednisone 20mg for 7 days then 10mg for 7 days (4/2022) -Information sheet given to the patient to be reviewed, this medication is never to be used without consulting the prescribing physician. Proper dietary restraint is necessary specifically salt containing foods, if any reaction may occur should be reported.  -Asthma is believed to be caused by inherited (genetic) and environmental factor, but its exact cause is unknown. Asthma may be triggered by allergens, lung infections, or irritants in the air. Asthma triggers are different for each person  Problem 1A: Eosinophilic Asthma  -on Nucala since 7/2022 - candidate for Fasenra or Nucala 100 q month (initiate) Eosinophil level 200 (4/28/2022) -The safety and efficacy of Nucala was established in three double-blind, randomized, placebo-controlled trials in patients with severe asthma. Compared to a placebo, patients with severe asthma receiving Nucala had fewer exacerbation requiring hospitalization and/or emergency department visits, and a longer time to first exacerbation.  In addition, patients with severe asthma receiving Nucala or Fasenra experienced greater reductions in their daily maintenance oral corticosteroid dose, while maintaining asthma control compared with patients receiving placebo. Treatment with Nucala did not result in a significant improvement in lung function, as measured by the volume of air exhaled by patients in one second. The most common side effects include: headache, injection site reactions, back pain, weakness, and fatigue; hypersensitivity reactions can occur within hours or days including swelling of the face, mouth, and tongue, fainting, dizziness, hives, breathing problems, and rash; herpes zoster infections have occurred. The drug is a monoclonal antibody that inhibits interleukin-5 which helps regular eosinophils, a type of white blood cell that contributes to asthma. The over-production of eosinophils can cause inflammation in the lungs, increasing the frequency of asthma attacks. Patients must also take other medications, including high dose inhaled corticosteroids and at least one additional asthma drug   Problem 1B: Symptomatic COVID-19 infection 7/2022(symptomatic since 7/18/2022)- resolved -Paxovid unable due to Eliquis -s/p Albuterol 0.83% via nebulizer, Q6H -s/p budesonide 0.5 BID  -recommended quarantine for 8-10 days -recommended Yessy-Hague Cold and Flu  -recommended Fisherman's Friend lozenges and Slippery Elm Throat Kote Tea   Problem 2: Allergy / Sinus (semi-active) - add Clarinex 5 mg QAM or equivalent -add Xyzal 5 mg QHS  -add Allegra 180 mg QHS  -s/p Xhance 1 sniff BID (failed Qnasl and Olopatidine) - move to Ryaltris 1 sniff BID -s/p blood work to include: IgE level, eosinophil level(+), vitamin D level(low), food IgE level(-), and asthma profile(+) Environmental measures for allergies were encouraged including mattress and pillow cover, air purifier, and environmental controls.  Problem 2A: Low vitamin D  - on rx  Low vitamin D levels have been associated with asthma exacerbations and increased allergic symptoms. The goal based on recent information is maintaining levels between 50-70 and low normal is 30. Recommended 50,000 units every two weeks to once a month depending on the level.   Problem 3: GERD / LPR -  s/p Nissen fundoplication - active -continue Pepcid 40 mg QHS -recommended Reflux Gourmet -Rule of 2s: avoid eating too much, eating too fast, eating too late, eating too spicy, eating too lousy, eating two hours before bed. -Things to avoid including overeating, spicy foods, tight clothing, eating within three hours of bed, this list is not all inclusive.  -For treatment of reflux, possible options discussed including diet control, H2 blockers, PPIs, as well as coating motility agents discussed as treatment options. Timing of meals and proximity of last meal to sleep were discussed. If symptoms persist, a formal gastrointestinal evaluation is needed.   Problem 4: (+)HEATHER(risk: neck size, chronic reflux, poor sleep) -discussed positional sleep  - Suggested Oral Appliance for sleep apnea (Danoff - dental device)  - f/p SS  Sleep apnea is associated with adverse clinical consequences which an affect most organ systems. Cardiovascular disease risk includes arrhythmias, atrial fibrillation, hypertension, coronary artery disease, and stroke. Metabolic disorders include diabetes type 2, non-alcoholic fatty liver disease. Mood disorder especially depression; and cognitive decline especially in the elderly. Associations with chronic reflux/Byrne's esophagus some but not all inclusive.  -Reasons include arousal consistent with hypopnea; respiratory events most prominent in REM sleep or supine position; therefore sleep staging and body position are important for accurate diagnosis and estimation of AHI.  Problem 5 : Poor Mechanics of Breathing  -Recommended Shweta Yanes breathing techniques  - Proper breathing techniques were reviewed with an emphasis of exhalation. Patient instructed to breath in for 1 second and out for four seconds. Patient was encouraged to not talk while walking.   Problem 6 : Overweight- improved -Recommended Margarito Celaya's 10-day detox diet and book.  -Recommend "Muniq" OTC Supplement  -Weight loss, exercise, and diet control were discussed and are highly encouraged. Treatment options were given such as, aqua therapy, and contacting a nutritionist. Recommended to use the elliptical, stationary bike, less use of treadmill. Mindful eating was explained to the patient Obesity is associated with worsening asthma, shortness of breath, and potential for cardiac disease, diabetes, and other underlying medical conditions.   Problem 7 : Cardiac (PFO)  -recommended to follow up with a cardiologist King/Divya - s/p PFO closure - complicated by AFib and now on Eliquis - recommended to follow up with vascular and neurologist   Problem 8 : health maintenance -add choline supplements -recommended Sanotize anti viral nasal spray in case of viral infection  - covid-19 vaccine x3  - Recommended Throat Coat Tea (Slippery Elm)  -s/p influenza vaccine 2023 -recommended strep pneumonia vaccines after age 65: Prevnar-13 vaccine, followed by Pneumo vaccine 23 one year following -recommended early intervention for URIs -recommended regular osteoporosis evaluations -recommended early dermatological evaluations -recommended after the age of 50 to the age of 70, colonoscopy every 5 years   F/P in 4-6 months  -He  is recommended to call with any changes, questions, or concerns.

## 2024-10-14 ENCOUNTER — APPOINTMENT (OUTPATIENT)
Dept: ORTHOPEDIC SURGERY | Facility: CLINIC | Age: 54
End: 2024-10-14

## 2024-11-06 ENCOUNTER — NON-APPOINTMENT (OUTPATIENT)
Age: 54
End: 2024-11-06

## 2024-11-06 ENCOUNTER — APPOINTMENT (OUTPATIENT)
Dept: UROLOGY | Facility: CLINIC | Age: 54
End: 2024-11-06
Payer: COMMERCIAL

## 2024-11-06 VITALS
RESPIRATION RATE: 16 BRPM | HEIGHT: 74 IN | HEART RATE: 69 BPM | SYSTOLIC BLOOD PRESSURE: 127 MMHG | TEMPERATURE: 98 F | OXYGEN SATURATION: 96 % | WEIGHT: 210 LBS | DIASTOLIC BLOOD PRESSURE: 83 MMHG | BODY MASS INDEX: 26.95 KG/M2

## 2024-11-06 DIAGNOSIS — N50.82 SCROTAL PAIN: ICD-10-CM

## 2024-11-06 DIAGNOSIS — N52.9 MALE ERECTILE DYSFUNCTION, UNSPECIFIED: ICD-10-CM

## 2024-11-06 PROCEDURE — 99213 OFFICE O/P EST LOW 20 MIN: CPT

## 2024-11-06 RX ORDER — LEVOFLOXACIN 750 MG/1
750 TABLET, FILM COATED ORAL DAILY
Qty: 7 | Refills: 0 | Status: ACTIVE | COMMUNITY
Start: 2024-11-06 | End: 1900-01-01

## 2024-11-07 ENCOUNTER — APPOINTMENT (OUTPATIENT)
Dept: ULTRASOUND IMAGING | Facility: IMAGING CENTER | Age: 54
End: 2024-11-07
Payer: COMMERCIAL

## 2024-11-07 ENCOUNTER — OUTPATIENT (OUTPATIENT)
Dept: OUTPATIENT SERVICES | Facility: HOSPITAL | Age: 54
LOS: 1 days | End: 2024-11-07
Payer: COMMERCIAL

## 2024-11-07 DIAGNOSIS — N50.82 SCROTAL PAIN: ICD-10-CM

## 2024-11-07 DIAGNOSIS — Z95.818 PRESENCE OF OTHER CARDIAC IMPLANTS AND GRAFTS: Chronic | ICD-10-CM

## 2024-11-07 DIAGNOSIS — Q21.12 PATENT FORAMEN OVALE: Chronic | ICD-10-CM

## 2024-11-07 DIAGNOSIS — Z87.19 PERSONAL HISTORY OF OTHER DISEASES OF THE DIGESTIVE SYSTEM: Chronic | ICD-10-CM

## 2024-11-07 LAB
APPEARANCE: CLEAR
BACTERIA: NEGATIVE /HPF
BILIRUBIN URINE: NEGATIVE
BLOOD URINE: NEGATIVE
C TRACH RRNA SPEC QL NAA+PROBE: NOT DETECTED
CAST: 0 /LPF
COLOR: YELLOW
EPITHELIAL CELLS: 0 /HPF
GLUCOSE QUALITATIVE U: NEGATIVE MG/DL
KETONES URINE: NEGATIVE MG/DL
LEUKOCYTE ESTERASE URINE: NEGATIVE
MICROSCOPIC-UA: NORMAL
N GONORRHOEA RRNA SPEC QL NAA+PROBE: NOT DETECTED
NITRITE URINE: NEGATIVE
PH URINE: 6
PROTEIN URINE: NEGATIVE MG/DL
RED BLOOD CELLS URINE: 0 /HPF
SOURCE AMPLIFICATION: NORMAL
SPECIFIC GRAVITY URINE: 1.02
UROBILINOGEN URINE: 0.2 MG/DL
WHITE BLOOD CELLS URINE: 0 /HPF

## 2024-11-07 PROCEDURE — 76870 US EXAM SCROTUM: CPT

## 2024-11-07 PROCEDURE — 76870 US EXAM SCROTUM: CPT | Mod: 26

## 2024-11-08 LAB — BACTERIA UR CULT: NORMAL

## 2024-11-11 ENCOUNTER — TRANSCRIPTION ENCOUNTER (OUTPATIENT)
Age: 54
End: 2024-11-11

## 2024-11-12 ENCOUNTER — TRANSCRIPTION ENCOUNTER (OUTPATIENT)
Age: 54
End: 2024-11-12

## 2024-11-13 ENCOUNTER — APPOINTMENT (OUTPATIENT)
Dept: UROLOGY | Facility: CLINIC | Age: 54
End: 2024-11-13
Payer: COMMERCIAL

## 2024-11-13 DIAGNOSIS — Z87.898 PERSONAL HISTORY OF OTHER SPECIFIED CONDITIONS: ICD-10-CM

## 2024-11-13 DIAGNOSIS — Z12.5 ENCOUNTER FOR SCREENING FOR MALIGNANT NEOPLASM OF PROSTATE: ICD-10-CM

## 2024-11-13 DIAGNOSIS — R68.82 DECREASED LIBIDO: ICD-10-CM

## 2024-11-13 PROCEDURE — 99213 OFFICE O/P EST LOW 20 MIN: CPT

## 2024-11-14 LAB — PSA SERPL-MCNC: 1.13 NG/ML

## 2024-11-21 NOTE — HISTORY OF PRESENT ILLNESS
[FreeTextEntry1] : 51 year old male with PMH of small CVA in past and known posterior fossa arachnoid cyst (follows with Dr. Ferreira per report 2020 imaging stable) presented to ER with sudden onset R hemiparesis and ataxia. Intact on exam except subjective R sided heaviness and mild RUE/RLE sensory changes. Found to have an arachnoid cyst with 1cm Tonsilar herniation, no headaches or UMN signs. Neurology consulted for code stroke, CTA H/N negative for LVO, therefore pt was not a candidate for thrombectomy. MRI showed L punctate corona radiata infarct, for which neurology stroke service was asked to further evaluate. Was initially placed on DAPT per [facility]\par protocol. Loop recorder was interrogated and no events were detected. ECHO was performed and was wnl. LE doppler were negative for DVT. Neurology follow up for acute stroke, start asa 81 mg and plavix 75 mg daily\par for 3 weeks, followed specifically by Plavix 75mg PO daily indefinitely, due to likely failing home ASA. Patient to follow up with Dr Ferreira as outpatient.\par 
no concerns

## 2024-11-27 ENCOUNTER — RX RENEWAL (OUTPATIENT)
Age: 54
End: 2024-11-27

## 2024-12-23 ENCOUNTER — TRANSCRIPTION ENCOUNTER (OUTPATIENT)
Age: 54
End: 2024-12-23

## 2025-01-02 ENCOUNTER — APPOINTMENT (OUTPATIENT)
Dept: UROLOGY | Facility: CLINIC | Age: 55
End: 2025-01-02
Payer: COMMERCIAL

## 2025-01-02 VITALS
BODY MASS INDEX: 26.95 KG/M2 | WEIGHT: 210 LBS | TEMPERATURE: 98 F | DIASTOLIC BLOOD PRESSURE: 67 MMHG | HEART RATE: 65 BPM | RESPIRATION RATE: 16 BRPM | OXYGEN SATURATION: 98 % | HEIGHT: 74 IN | SYSTOLIC BLOOD PRESSURE: 112 MMHG

## 2025-01-02 DIAGNOSIS — N52.9 MALE ERECTILE DYSFUNCTION, UNSPECIFIED: ICD-10-CM

## 2025-01-02 DIAGNOSIS — Z12.5 ENCOUNTER FOR SCREENING FOR MALIGNANT NEOPLASM OF PROSTATE: ICD-10-CM

## 2025-01-02 PROCEDURE — 99213 OFFICE O/P EST LOW 20 MIN: CPT

## 2025-01-14 ENCOUNTER — TRANSCRIPTION ENCOUNTER (OUTPATIENT)
Age: 55
End: 2025-01-14

## 2025-01-16 ENCOUNTER — NON-APPOINTMENT (OUTPATIENT)
Age: 55
End: 2025-01-16

## 2025-01-16 ENCOUNTER — APPOINTMENT (OUTPATIENT)
Dept: PULMONOLOGY | Facility: CLINIC | Age: 55
End: 2025-01-16
Payer: COMMERCIAL

## 2025-01-16 VITALS
TEMPERATURE: 97.6 F | BODY MASS INDEX: 26.31 KG/M2 | OXYGEN SATURATION: 97 % | RESPIRATION RATE: 16 BRPM | SYSTOLIC BLOOD PRESSURE: 110 MMHG | WEIGHT: 205 LBS | HEIGHT: 74 IN | HEART RATE: 68 BPM | DIASTOLIC BLOOD PRESSURE: 80 MMHG

## 2025-01-16 DIAGNOSIS — J30.9 ALLERGIC RHINITIS, UNSPECIFIED: ICD-10-CM

## 2025-01-16 DIAGNOSIS — R06.02 SHORTNESS OF BREATH: ICD-10-CM

## 2025-01-16 DIAGNOSIS — M25.569 PAIN IN UNSPECIFIED KNEE: ICD-10-CM

## 2025-01-16 DIAGNOSIS — J45.50 SEVERE PERSISTENT ASTHMA, UNCOMPLICATED: ICD-10-CM

## 2025-01-16 DIAGNOSIS — K21.9 GASTRO-ESOPHAGEAL REFLUX DISEASE W/OUT ESOPHAGITIS: ICD-10-CM

## 2025-01-16 DIAGNOSIS — G47.33 OBSTRUCTIVE SLEEP APNEA (ADULT) (PEDIATRIC): ICD-10-CM

## 2025-01-16 PROCEDURE — 99214 OFFICE O/P EST MOD 30 MIN: CPT | Mod: 25

## 2025-01-16 PROCEDURE — 95012 NITRIC OXIDE EXP GAS DETER: CPT

## 2025-01-16 PROCEDURE — 94010 BREATHING CAPACITY TEST: CPT

## 2025-01-16 RX ORDER — OLOPATADINE HYDROCHLORIDE 665 UG/1
0.6 SPRAY, METERED NASAL
Qty: 3 | Refills: 1 | Status: ACTIVE | COMMUNITY
Start: 2025-01-16 | End: 1900-01-01

## 2025-01-17 ENCOUNTER — TRANSCRIPTION ENCOUNTER (OUTPATIENT)
Age: 55
End: 2025-01-17

## 2025-01-17 ENCOUNTER — APPOINTMENT (OUTPATIENT)
Dept: PULMONOLOGY | Facility: CLINIC | Age: 55
End: 2025-01-17

## 2025-06-05 ENCOUNTER — APPOINTMENT (OUTPATIENT)
Dept: PULMONOLOGY | Facility: CLINIC | Age: 55
End: 2025-06-05
Payer: COMMERCIAL

## 2025-06-05 VITALS
WEIGHT: 209 LBS | BODY MASS INDEX: 26.82 KG/M2 | TEMPERATURE: 97.3 F | HEART RATE: 77 BPM | DIASTOLIC BLOOD PRESSURE: 70 MMHG | RESPIRATION RATE: 16 BRPM | OXYGEN SATURATION: 98 % | HEIGHT: 74 IN | SYSTOLIC BLOOD PRESSURE: 102 MMHG

## 2025-06-05 DIAGNOSIS — R06.02 SHORTNESS OF BREATH: ICD-10-CM

## 2025-06-05 DIAGNOSIS — K21.9 GASTRO-ESOPHAGEAL REFLUX DISEASE W/OUT ESOPHAGITIS: ICD-10-CM

## 2025-06-05 DIAGNOSIS — J45.50 SEVERE PERSISTENT ASTHMA, UNCOMPLICATED: ICD-10-CM

## 2025-06-05 DIAGNOSIS — J30.9 ALLERGIC RHINITIS, UNSPECIFIED: ICD-10-CM

## 2025-06-05 DIAGNOSIS — G47.33 OBSTRUCTIVE SLEEP APNEA (ADULT) (PEDIATRIC): ICD-10-CM

## 2025-06-05 DIAGNOSIS — J82.83 EOSINOPHILIC ASTHMA: ICD-10-CM

## 2025-06-05 PROCEDURE — 99214 OFFICE O/P EST MOD 30 MIN: CPT | Mod: 25

## 2025-06-05 PROCEDURE — 94729 DIFFUSING CAPACITY: CPT

## 2025-06-05 PROCEDURE — ZZZZZ: CPT

## 2025-06-05 PROCEDURE — 94727 GAS DIL/WSHOT DETER LNG VOL: CPT

## 2025-06-05 PROCEDURE — 94010 BREATHING CAPACITY TEST: CPT

## 2025-06-05 PROCEDURE — 95012 NITRIC OXIDE EXP GAS DETER: CPT

## 2025-06-19 ENCOUNTER — RX RENEWAL (OUTPATIENT)
Age: 55
End: 2025-06-19

## 2025-07-21 ENCOUNTER — RX RENEWAL (OUTPATIENT)
Age: 55
End: 2025-07-21

## 2025-07-28 ENCOUNTER — TRANSCRIPTION ENCOUNTER (OUTPATIENT)
Age: 55
End: 2025-07-28

## 2025-09-16 ENCOUNTER — NON-APPOINTMENT (OUTPATIENT)
Age: 55
End: 2025-09-16

## (undated) DEVICE — KIT ENDO PROCEDURE CUST W/VLV

## (undated) DEVICE — POSITIONER PATIENT SAFETY STRAP 3X60"

## (undated) DEVICE — SUT PROLENE 0 30" CT-2

## (undated) DEVICE — SOL IRR POUR NS 0.9% 500ML

## (undated) DEVICE — SUT VICRYL 2-0 27" SH UNDYED

## (undated) DEVICE — ENDOCUFF VISION SZ 2 LG GRN

## (undated) DEVICE — DRSG BENZOIN 0.6CC

## (undated) DEVICE — ELCTR GROUNDING PAD ADULT COVIDIEN

## (undated) DEVICE — SUT VICRYL 0 27" UR-6

## (undated) DEVICE — DRAIN PENROSE .5" X 18" LATEX

## (undated) DEVICE — GLV 7.5 PROTEXIS (WHITE)

## (undated) DEVICE — DRAPE TOWEL BLUE 17" X 24"

## (undated) DEVICE — SNARE POLYP SENS SM 13MM 240CM

## (undated) DEVICE — LAP PAD W RING 18 X 18"

## (undated) DEVICE — WARMING BLANKET UPPER ADULT

## (undated) DEVICE — PACK MINOR NO DRAPE

## (undated) DEVICE — SUT VICRYL 2-0 18" TIES UNDYED

## (undated) DEVICE — VENODYNE/SCD SLEEVE CALF MEDIUM

## (undated) DEVICE — PLATE NESSY 170

## (undated) DEVICE — SUT VICRYL 3-0 27" SH UNDYED

## (undated) DEVICE — DRSG TEGADERM 4X4.75"

## (undated) DEVICE — SUT PROLENE 1 30" CT-1

## (undated) DEVICE — SOL IRR POUR H2O 500ML

## (undated) DEVICE — SUT MONOCRYL 4-0 27" PS-2 UNDYED

## (undated) DEVICE — SPONGE DISSECTOR PEANUT

## (undated) DEVICE — SYR LUER LOK 20CC

## (undated) DEVICE — GOWN XL

## (undated) DEVICE — POLY TRAP ETRAP

## (undated) DEVICE — DRSG STERISTRIPS 0.5 X 4"

## (undated) DEVICE — DRSG TELFA 3 X 8

## (undated) DEVICE — POSITIONER FOAM EGG CRATE ULNAR 2PCS (PINK)

## (undated) DEVICE — ELCTR ROCKER SWITCH PENCIL BLUE 10FT

## (undated) DEVICE — DRAPE LAPAROTOMY TRANSVERSE T 102X78X121"